# Patient Record
Sex: FEMALE | Race: WHITE | Employment: FULL TIME | URBAN - METROPOLITAN AREA
[De-identification: names, ages, dates, MRNs, and addresses within clinical notes are randomized per-mention and may not be internally consistent; named-entity substitution may affect disease eponyms.]

---
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
## 2017-07-03 ENCOUNTER — TELEPHONE (OUTPATIENT)
Dept: FAMILY MEDICINE CLINIC | Facility: CLINIC | Age: 47
End: 2017-07-03

## 2017-07-03 ENCOUNTER — OFFICE VISIT (OUTPATIENT)
Dept: FAMILY MEDICINE CLINIC | Facility: CLINIC | Age: 47
End: 2017-07-03

## 2017-07-03 VITALS
DIASTOLIC BLOOD PRESSURE: 78 MMHG | HEART RATE: 60 BPM | RESPIRATION RATE: 16 BRPM | TEMPERATURE: 98 F | BODY MASS INDEX: 44 KG/M2 | SYSTOLIC BLOOD PRESSURE: 112 MMHG | WEIGHT: 245 LBS

## 2017-07-03 DIAGNOSIS — L23.7 POISON IVY DERMATITIS: Primary | ICD-10-CM

## 2017-07-03 PROCEDURE — 99214 OFFICE O/P EST MOD 30 MIN: CPT | Performed by: FAMILY MEDICINE

## 2017-07-03 RX ORDER — PREDNISONE 20 MG/1
TABLET ORAL
Qty: 18 TABLET | Refills: 0 | Status: SHIPPED | OUTPATIENT
Start: 2017-07-03 | End: 2018-06-20

## 2017-07-03 NOTE — PROGRESS NOTES
Frankey Mayor is a 40year old female. CC:  Patient presents with:  Rash Skin Problem (integumentary): per pt, poison ivy on legs, arms, wrists, stomach      HPI:  Exposed to poison ivy.  She has had a blistering, itchy rash on B legs for on and lids normal  HENT: No oral lesions. ; 2  NECK: No lymphadenopathy, thyromegaly or masses  CAR: S1, S2 normal, RRR; no S3, no S4; no click; murmur negative  PULM: clear to auscultation B, no accessory muscle use  GI: not examined  PSYCH: alert and orient

## 2017-07-03 NOTE — TELEPHONE ENCOUNTER
Patient stopped in the office and states that she has poison ivy and wants to know if one of the other MDs can work her in today?

## 2017-07-18 ENCOUNTER — TELEPHONE (OUTPATIENT)
Dept: FAMILY MEDICINE CLINIC | Facility: CLINIC | Age: 47
End: 2017-07-18

## 2017-07-18 ENCOUNTER — OFFICE VISIT (OUTPATIENT)
Dept: FAMILY MEDICINE CLINIC | Facility: CLINIC | Age: 47
End: 2017-07-18

## 2017-07-18 VITALS
DIASTOLIC BLOOD PRESSURE: 70 MMHG | SYSTOLIC BLOOD PRESSURE: 130 MMHG | WEIGHT: 240.63 LBS | BODY MASS INDEX: 43 KG/M2 | HEART RATE: 66 BPM | RESPIRATION RATE: 14 BRPM | TEMPERATURE: 98 F

## 2017-07-18 DIAGNOSIS — J01.40 ACUTE NON-RECURRENT PANSINUSITIS: Primary | ICD-10-CM

## 2017-07-18 DIAGNOSIS — R05.9 COUGH: ICD-10-CM

## 2017-07-18 PROCEDURE — 99214 OFFICE O/P EST MOD 30 MIN: CPT | Performed by: FAMILY MEDICINE

## 2017-07-18 RX ORDER — DOXYCYCLINE HYCLATE 100 MG/1
100 CAPSULE ORAL 2 TIMES DAILY
COMMUNITY
End: 2017-07-18 | Stop reason: ALTCHOICE

## 2017-07-18 RX ORDER — PROMETHAZINE HYDROCHLORIDE AND CODEINE PHOSPHATE 6.25; 1 MG/5ML; MG/5ML
SYRUP ORAL EVERY 4 HOURS PRN
Qty: 150 ML | Refills: 0 | Status: SHIPPED
Start: 2017-07-18 | End: 2017-07-25

## 2017-07-18 RX ORDER — AZITHROMYCIN 250 MG/1
TABLET, FILM COATED ORAL
Qty: 6 TABLET | Refills: 0 | Status: SHIPPED | OUTPATIENT
Start: 2017-07-18 | End: 2017-09-26 | Stop reason: ALTCHOICE

## 2017-07-18 NOTE — TELEPHONE ENCOUNTER
Phentermine HCl 30 MG Oral Cap 30 capsule 1 6/21/2017     Sig - Route: Take 1 capsule (30 mg total) by mouth every morning. - Oral      BuPROPion HCl ER, XL, 150 MG Oral Tablet 24 Hr 30 tablet 1 6/21/2017     Sig - Route:  Take 1 tablet (150 mg total) by mo

## 2017-07-18 NOTE — PROGRESS NOTES
HPI:   Tiffanie Schreiber is a 40year old female who presents for upper respiratory symptoms for 10 days. Symptoms include sore throat, head pressure, a lot of post nasal drip, ear pressure, cough, lost voice, tired, chest hurts from coughing.  She h discharge  HEENT: congested, sore throat, face/head/ear pressure  LUNGS: denies shortness of breath with exertion; + cough  CARDIOVASCULAR: denies chest pain on exertion  GI: no nausea or abdominal pain  NEURO: denies headaches    EXAM:   /70   Pulse

## 2017-08-19 RX ORDER — BUPROPION HYDROCHLORIDE 150 MG/1
150 TABLET ORAL DAILY
Qty: 30 TABLET | Refills: 1 | Status: SHIPPED | OUTPATIENT
Start: 2017-08-19 | End: 2017-09-26 | Stop reason: ALTCHOICE

## 2017-08-19 RX ORDER — PHENTERMINE HYDROCHLORIDE 30 MG/1
30 CAPSULE ORAL EVERY MORNING
Qty: 30 CAPSULE | Refills: 0 | Status: SHIPPED | OUTPATIENT
Start: 2017-08-19 | End: 2017-09-26

## 2017-08-19 NOTE — TELEPHONE ENCOUNTER
Script sent/printed--please notify pt she is due for office visit prior to further fills on phentermine, thanks

## 2017-08-19 NOTE — TELEPHONE ENCOUNTER
Pt needs a refill of the   Phentermine HCl 30 MG Oral Cap  BuPROPion HCl ER, XL, 150 MG Oral Tablet 24 Hr  Pharmacy is [facility] in [place]

## 2017-09-26 PROBLEM — F51.04 CHRONIC INSOMNIA: Status: ACTIVE | Noted: 2017-09-26

## 2017-09-26 NOTE — PROGRESS NOTES
Joe Cantu is a 56year old female. HPI:   Pt is here to f/u on her mood, weight, medications. She thinks her mood is doing better, but not quite where she'd like it to be. 2-3 days/week she feels sadder/flatter than she'd like to.  The ot Alcohol use: Yes           0.0 oz/week     Comment: rare          REVIEW OF SYSTEMS:   GENERAL HEALTH: feels well otherwise  SKIN: denies any unusual skin lesions or rashes  RESPIRATORY: denies shortness of breath with exertion  CARDIOVASCULAR: denies mouth every morning. The patient indicates understanding of these issues and agrees to the plan. The patient is asked to return in 6-8 weeks.

## 2017-09-27 ENCOUNTER — MED REC SCAN ONLY (OUTPATIENT)
Dept: FAMILY MEDICINE CLINIC | Facility: CLINIC | Age: 47
End: 2017-09-27

## 2017-09-27 ENCOUNTER — TELEPHONE (OUTPATIENT)
Dept: FAMILY MEDICINE CLINIC | Facility: CLINIC | Age: 47
End: 2017-09-27

## 2017-09-27 NOTE — TELEPHONE ENCOUNTER
----- Message from David Nina MD sent at 9/27/2017  8:33 AM CDT -----  Regarding: PT referral  Please notify pt I received her podiatry records and placed referral for the PT in our basement, thanks

## 2017-09-27 NOTE — TELEPHONE ENCOUNTER
[address] [facility]  [phone]      Spoke with the pt and advised of the therapy orders and the phone number to call- she v/u

## 2017-10-12 ENCOUNTER — TELEPHONE (OUTPATIENT)
Dept: FAMILY MEDICINE CLINIC | Facility: CLINIC | Age: 47
End: 2017-10-12

## 2017-10-12 NOTE — TELEPHONE ENCOUNTER
Pt called, states she is missing a page of the fax of the billing information that Billing from the hospital faxed here to us yesterday to Dr Marcelo Quick nurse.   Please call pt at [phone]

## 2017-10-12 NOTE — TELEPHONE ENCOUNTER
Patient states that she had the hospital fax her billing information to this office. Her  picked it up and she thinks that a page is missing.   Advised that she would need to contact the hospital and ask them to refax the information that she had re

## 2017-10-23 ENCOUNTER — OFFICE VISIT (OUTPATIENT)
Dept: PHYSICAL THERAPY | Age: 47
End: 2017-10-23
Attending: FAMILY MEDICINE
Payer: COMMERCIAL

## 2017-10-23 DIAGNOSIS — M76.71 PERONEAL TENDINITIS OF RIGHT LOWER EXTREMITY: ICD-10-CM

## 2017-10-23 PROCEDURE — 97110 THERAPEUTIC EXERCISES: CPT

## 2017-10-23 PROCEDURE — 97161 PT EVAL LOW COMPLEX 20 MIN: CPT

## 2017-10-23 NOTE — PROGRESS NOTES
LOWER EXTREMITY EVALUATION:   Referring Physician: Dr. Chirag Lopez  Diagnosis: Peroneal tendinitis of bilateral LE  Date of Service: 10/23/2017     PATIENT SUMMARY   Isrrael Moore is a 56year old y/o female who presents to therapy today with complain She has restricted dorsiflexion, plantarflexion and eversion of the left ankle > right ankle. She has decreased strength of tom ankles and difficulty with balance/proprioception.    Alessia Cox would benefit from skilled Physical Therapy to address the above im visits)  · Pt will have increased ankle strength to 5/5 throughout for improved ankle control with ADLs such as prolonged gait and stair negotiation (12 visits)  · Pt will have improved SLS to >30s for increased ankle stability with ambulation on uneven warren

## 2017-10-30 ENCOUNTER — OFFICE VISIT (OUTPATIENT)
Dept: PHYSICAL THERAPY | Age: 47
End: 2017-10-30
Attending: FAMILY MEDICINE
Payer: COMMERCIAL

## 2017-10-30 DIAGNOSIS — M76.71 PERONEAL TENDINITIS OF RIGHT LOWER EXTREMITY: ICD-10-CM

## 2017-10-30 PROCEDURE — 97140 MANUAL THERAPY 1/> REGIONS: CPT

## 2017-10-30 PROCEDURE — 97110 THERAPEUTIC EXERCISES: CPT

## 2017-10-30 NOTE — PROGRESS NOTES
Dx: Peroneal tendinitis of bilateral LE          Authorized # of Visits:  Oscar Sorto PPO 12 recommended       Next MD visit: none scheduled Fall Risk: standard         Precautions: None    Subjective: States she is doing well, has the same soreness.  Was on her f Date:   Tx#: 3 Date: Tx#: 4 Date: Tx#: 5 Date: Tx#: 6 Date: Tx#: 7 Date:   Tx#: 8   Ther-Ex Ther-Ex  Ther-Ex  Ther-Ex Ther-Ex Ther-Ex  There-Ex   TM walking x 10min self selected pace, 1.9mph         AROM DF/PF/inv/ev x10         Gastroc/soleus stret

## 2017-11-02 ENCOUNTER — OFFICE VISIT (OUTPATIENT)
Dept: PHYSICAL THERAPY | Age: 47
End: 2017-11-02
Attending: FAMILY MEDICINE
Payer: COMMERCIAL

## 2017-11-02 DIAGNOSIS — M76.71 PERONEAL TENDINITIS OF RIGHT LOWER EXTREMITY: ICD-10-CM

## 2017-11-02 PROCEDURE — 97110 THERAPEUTIC EXERCISES: CPT

## 2017-11-02 PROCEDURE — 97140 MANUAL THERAPY 1/> REGIONS: CPT

## 2017-11-02 NOTE — PROGRESS NOTES
Dx: Peroneal tendinitis of bilateral LE          Authorized # of Visits:  [facility] EMETERIO ROSAS PPO 12 recommended       Next MD visit: none scheduled Fall Risk: standard         Precautions: None    Subjective: States both of her feet were very sore after sitting for a lo Treatment: 47 min  Total Treatment Time: 45 min  Date: 10/30/2017  Tx#: 2 Date: Tx#: 3 Date: Tx#: 4 Date: Tx#: 5 Date: Tx#: 6 Date: Tx#: 7 Date:   Tx#: 8   Ther-Ex Ther-Ex  Ther-Ex  Ther-Ex Ther-Ex Ther-Ex  There-Ex   TM walking x 10min self select

## 2017-11-06 ENCOUNTER — OFFICE VISIT (OUTPATIENT)
Dept: PHYSICAL THERAPY | Age: 47
End: 2017-11-06
Attending: FAMILY MEDICINE
Payer: COMMERCIAL

## 2017-11-06 PROCEDURE — 97110 THERAPEUTIC EXERCISES: CPT

## 2017-11-06 PROCEDURE — 97140 MANUAL THERAPY 1/> REGIONS: CPT

## 2017-11-06 NOTE — PROGRESS NOTES
Dx: Peroneal tendinitis of bilateral LE          Authorized # of Visits:  Fabiola Mauricio 150 PPO 12 recommended       Next MD visit: none scheduled Fall Risk: standard         Precautions: None    Subjective: She states her left arch was a little sore this morning, but Ther-Ex Ther-Ex  Ther-Ex  Ther-Ex Ther-Ex Ther-Ex  There-Ex   TM walking x 10min self selected pace, 1.9mph TM walking x 10min self selected pace, 1.9mph TM walking x 10 min, 2.0mph       AROM DF/PF/inv/ev x10 Seated PF with red tband/focus on eccentric

## 2017-11-08 ENCOUNTER — OFFICE VISIT (OUTPATIENT)
Dept: FAMILY MEDICINE CLINIC | Facility: CLINIC | Age: 47
End: 2017-11-08

## 2017-11-08 VITALS
WEIGHT: 232.63 LBS | RESPIRATION RATE: 14 BRPM | DIASTOLIC BLOOD PRESSURE: 80 MMHG | HEART RATE: 74 BPM | BODY MASS INDEX: 42 KG/M2 | TEMPERATURE: 99 F | SYSTOLIC BLOOD PRESSURE: 120 MMHG

## 2017-11-08 DIAGNOSIS — F43.21 ADJUSTMENT DISORDER WITH DEPRESSED MOOD: ICD-10-CM

## 2017-11-08 DIAGNOSIS — F51.04 CHRONIC INSOMNIA: ICD-10-CM

## 2017-11-08 DIAGNOSIS — Z71.3 WEIGHT LOSS COUNSELING, ENCOUNTER FOR: Primary | ICD-10-CM

## 2017-11-08 PROCEDURE — 99213 OFFICE O/P EST LOW 20 MIN: CPT | Performed by: FAMILY MEDICINE

## 2017-11-08 NOTE — PROGRESS NOTES
Polo Saunders is a 56year old female. HPI:   Pt is here to f/u from her last visit end of sept. We upped her wellbutrin to 300 and she feels much better, less emotional, less chavez, more even keel. No side effect.  She is super happy about well developed, well nourished,in no apparent distress  SKIN: no rashes,no suspicious lesions  LUNGS: clear to auscultation  CARDIO: RRR without murmur  PSYCH: good affect, pleasant, appropriate, cooperative  EXTREMITIES: no cyanosis, clubbing or edema

## 2017-11-13 ENCOUNTER — OFFICE VISIT (OUTPATIENT)
Dept: PHYSICAL THERAPY | Age: 47
End: 2017-11-13
Attending: FAMILY MEDICINE
Payer: COMMERCIAL

## 2017-11-16 ENCOUNTER — OFFICE VISIT (OUTPATIENT)
Dept: PHYSICAL THERAPY | Age: 47
End: 2017-11-16
Attending: FAMILY MEDICINE
Payer: COMMERCIAL

## 2017-11-16 DIAGNOSIS — M76.71 PERONEAL TENDINITIS OF RIGHT LOWER EXTREMITY: ICD-10-CM

## 2017-11-16 PROCEDURE — 97112 NEUROMUSCULAR REEDUCATION: CPT

## 2017-11-16 PROCEDURE — 97140 MANUAL THERAPY 1/> REGIONS: CPT

## 2017-11-16 PROCEDURE — 97110 THERAPEUTIC EXERCISES: CPT

## 2017-11-16 NOTE — PROGRESS NOTES
Dx: Peroneal tendinitis of bilateral LE          Authorized # of Visits:  Earl Sharp PPO 12 recommended       Next MD visit: none scheduled Fall Risk: standard         Precautions: None    Subjective: She states she was sitting on a bar stool yesterday with her 10/30/2017  Tx#: 2 Date: Tx#: 3 Date: 11/6/2017  Tx#: 4 Date: 11/16/2017  Tx#: 5 Date: Tx#: 6 Date: Tx#: 7 Date:   Tx#: 8   Ther-Ex Ther-Ex  Ther-Ex  Ther-Ex Ther-Ex Ther-Ex  There-Ex   TM walking x 10min self selected pace, 1.9mph TM walking x 10min

## 2017-11-20 ENCOUNTER — OFFICE VISIT (OUTPATIENT)
Dept: PHYSICAL THERAPY | Age: 47
End: 2017-11-20
Attending: FAMILY MEDICINE
Payer: COMMERCIAL

## 2017-11-20 DIAGNOSIS — M76.71 PERONEAL TENDINITIS OF RIGHT LOWER EXTREMITY: ICD-10-CM

## 2017-11-20 PROCEDURE — 97112 NEUROMUSCULAR REEDUCATION: CPT

## 2017-11-20 PROCEDURE — 97110 THERAPEUTIC EXERCISES: CPT

## 2017-11-20 PROCEDURE — 97140 MANUAL THERAPY 1/> REGIONS: CPT

## 2017-11-20 NOTE — PROGRESS NOTES
Dx: Peroneal tendinitis of bilateral LE          Authorized # of Visits:  [employer] PPO 12 recommended       Next MD visit: none scheduled Fall Risk: standard         Precautions: None    Subjective: Navneet Quispe states she is doing well.  She had some pain in her ri min  Date: 10/30/2017  Tx#: 2 Date: Tx#: 3 Date: 11/6/2017  Tx#: 4 Date: 11/16/2017  Tx#: 5 Date: 11/20/2017  Tx#: 6 Date: Tx#: 7 Date:   Tx#: 8   Ther-Ex Ther-Ex  Ther-Ex  Ther-Ex Ther-Ex Ther-Ex  There-Ex   TM walking x 10min self selected pace, 1.9mp

## 2017-11-27 ENCOUNTER — OFFICE VISIT (OUTPATIENT)
Dept: PHYSICAL THERAPY | Age: 47
End: 2017-11-27
Attending: FAMILY MEDICINE
Payer: COMMERCIAL

## 2017-11-27 DIAGNOSIS — M76.71 PERONEAL TENDINITIS OF RIGHT LOWER EXTREMITY: ICD-10-CM

## 2017-11-27 PROCEDURE — 97110 THERAPEUTIC EXERCISES: CPT

## 2017-11-27 NOTE — PROGRESS NOTES
Dx: Peroneal tendinitis of bilateral LE          Authorized # of Visits:  [employer] PPO 12 recommended       Next MD visit: none scheduled Fall Risk: standard         Precautions: None    Discharge Summary  Guanaco Brown has attended 7 visits in physical therapy for demonstrate improved DF AROM to >= 10 degrees to promote proper foot clearance during gait and greater ease descending stairs without compensation (4 visits) MET 11/27/2017  · Pt will have increased ankle strength to 5/5 throughout for improved ankle contr X 15 - - Reassessment x 10 min    Standing heel raises 2 sets x 10 Reviewed Gastroc/soleus stretch Gastroc/soleus stretch Gastroc/soleus stretch Gastroc/soleus stretch    -     Balance exercises for HEP    Manual Manual Manual Manual  Manual  Manual Issa

## 2017-11-29 RX ORDER — PHENTERMINE HYDROCHLORIDE 30 MG/1
CAPSULE ORAL
Qty: 30 CAPSULE | Refills: 1 | Status: SHIPPED
Start: 2017-11-29 | End: 2018-01-07

## 2017-11-29 RX ORDER — TRAZODONE HYDROCHLORIDE 100 MG/1
TABLET ORAL
Qty: 30 TABLET | Refills: 6 | Status: SHIPPED | OUTPATIENT
Start: 2017-11-29 | End: 2018-02-28

## 2017-11-29 NOTE — TELEPHONE ENCOUNTER
Last refills -   Phenergan - 9/26/17 - #30 with 1 refill  Trazodone - 8/20/16 - #30 with 6 refills  Last office visit- 11/8/17

## 2018-01-02 ENCOUNTER — TELEPHONE (OUTPATIENT)
Dept: FAMILY MEDICINE CLINIC | Facility: CLINIC | Age: 48
End: 2018-01-02
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
## 2018-01-02 NOTE — TELEPHONE ENCOUNTER
That would be unusual and a bit of a longshot, but certainly worth a try.  We would change to 150mg pill, take daily for 2 weeks then every other day for 10 days then stop and f/u with me a few weeks later to see how she's doing

## 2018-01-02 NOTE — TELEPHONE ENCOUNTER
PT CALLED AND ADV THAT SHE WOULD LIKE TO STOP TAKING     BuPROPion HCl ER, XL, 300 MG Oral Tablet 24 Hr    PT WOULD LIKE TO KNOW THE PROPER STEPS.  ADV PT MAY NOT GET A CALL UNTIL TOMORROW    PT V/U    THANK YOU

## 2018-01-03 RX ORDER — BUPROPION HYDROCHLORIDE 150 MG/1
150 TABLET ORAL DAILY
Qty: 19 TABLET | Refills: 0 | Status: SHIPPED | OUTPATIENT
Start: 2018-01-03 | End: 2018-02-28 | Stop reason: ALTCHOICE

## 2018-01-03 NOTE — TELEPHONE ENCOUNTER
Spoke with the pt and advised of the notes from Dr. Chirag Lopez to wean off of the buproprion.  Called the pt and advised of the notes form Dr. Chirag Lopez and she v/u called the 150mg tabs to the pharmacy

## 2018-01-08 RX ORDER — PHENTERMINE HYDROCHLORIDE 30 MG/1
CAPSULE ORAL
Qty: 30 CAPSULE | Refills: 0 | Status: SHIPPED
Start: 2018-01-08 | End: 2018-01-22

## 2018-01-08 NOTE — TELEPHONE ENCOUNTER
Spoke with the pt and advised that Dr. Lawrence Beltrán gave her a refill of the phentermine but she will need to be seen prior tp any more refills- advised that appointments are required every 2 months while on this med-  She v/u  I asked if she wanted to schedule an

## 2018-01-22 RX ORDER — PHENTERMINE HYDROCHLORIDE 30 MG/1
CAPSULE ORAL
Qty: 30 CAPSULE | Refills: 0 | Status: SHIPPED
Start: 2018-01-22 | End: 2018-02-28 | Stop reason: ALTCHOICE

## 2018-01-22 RX ORDER — PHENTERMINE HYDROCHLORIDE 30 MG/1
CAPSULE ORAL
Qty: 30 CAPSULE | Refills: 0
Start: 2018-01-22

## 2018-01-22 NOTE — TELEPHONE ENCOUNTER
She is aware that the refill was denied because she needs OV. She scheduled OV but cannot get in until you return, would you consider refilling under those circumstances?

## 2018-01-22 NOTE — TELEPHONE ENCOUNTER
Last refilled on 1/8/18 for # 30 with 0 rf. Last seen on 11/8/17. No future appointments. Thank you.

## 2018-01-30 ENCOUNTER — TELEPHONE (OUTPATIENT)
Dept: FAMILY MEDICINE CLINIC | Facility: CLINIC | Age: 48
End: 2018-01-30

## 2018-01-30 NOTE — TELEPHONE ENCOUNTER
Pt needs refill of the med the [facility] prescribed last week for weight control. Angelica Ogden does not have a script for this.

## 2018-01-30 NOTE — TELEPHONE ENCOUNTER
Called the pharmacy to verify that they received the script and they did- but it was refill too soon so it was put on hold- they processed it today and it did go through and the pt will be able to pick it up today  Called the pt and advised- she v/u

## 2018-03-01 NOTE — PROGRESS NOTES
Chapincito Miller is a 56year old female. HPI:   Pt is here to f/u on her weight/mood/sleep. She's like to stick with the trazodone, no noted SEs and helps her sleep.     She doesn't notice herself \"feeling\" different on phentermine, but she c Packs/day: 1.00      Years: 24.00        Types: Cigarettes     Start date: 10/21/2016  Smokeless tobacco: Never Used                      Alcohol use: Yes           0.0 oz/week     Comment: rare          REVIEW OF SYSTEMS:   GENERAL HEALTH: feels well oth

## 2018-05-09 ENCOUNTER — OFFICE VISIT (OUTPATIENT)
Dept: FAMILY MEDICINE CLINIC | Facility: CLINIC | Age: 48
End: 2018-05-09

## 2018-05-09 VITALS
HEART RATE: 74 BPM | DIASTOLIC BLOOD PRESSURE: 80 MMHG | BODY MASS INDEX: 42 KG/M2 | RESPIRATION RATE: 12 BRPM | WEIGHT: 240 LBS | TEMPERATURE: 99 F | SYSTOLIC BLOOD PRESSURE: 120 MMHG

## 2018-05-09 DIAGNOSIS — Z12.31 ENCOUNTER FOR SCREENING MAMMOGRAM FOR BREAST CANCER: ICD-10-CM

## 2018-05-09 DIAGNOSIS — J02.9 PHARYNGITIS, UNSPECIFIED ETIOLOGY: Primary | ICD-10-CM

## 2018-05-09 PROCEDURE — 99213 OFFICE O/P EST LOW 20 MIN: CPT | Performed by: FAMILY MEDICINE

## 2018-05-09 NOTE — PROGRESS NOTES
HPI:    Patient ID: Valentin Kirkland is a 56year old female. Pt here to discuss sore throat. She developed a very sore throat on Saturday, hurt to swallow, hurt in neck and radiated toward her ears. No fever or chills. No body aches.   No ra Pharyngitis, unspecified etiology  (primary encounter diagnosis)  -likely from PND, viral or allergic; reviewed various OTC/home remedies to try, f/u in 1 week if no better, sooner for worsening symptoms  Encounter for screening mammogram for breast canc

## 2018-06-20 ENCOUNTER — OFFICE VISIT (OUTPATIENT)
Dept: FAMILY MEDICINE CLINIC | Facility: CLINIC | Age: 48
End: 2018-06-20

## 2018-06-20 VITALS
DIASTOLIC BLOOD PRESSURE: 84 MMHG | WEIGHT: 241.81 LBS | RESPIRATION RATE: 14 BRPM | HEART RATE: 79 BPM | BODY MASS INDEX: 42.31 KG/M2 | SYSTOLIC BLOOD PRESSURE: 124 MMHG | TEMPERATURE: 98 F | OXYGEN SATURATION: 99 % | HEIGHT: 63.5 IN

## 2018-06-20 DIAGNOSIS — F43.21 ADJUSTMENT DISORDER WITH DEPRESSED MOOD: ICD-10-CM

## 2018-06-20 DIAGNOSIS — F51.04 CHRONIC INSOMNIA: ICD-10-CM

## 2018-06-20 DIAGNOSIS — L23.7 POISON IVY: Primary | ICD-10-CM

## 2018-06-20 DIAGNOSIS — Z71.3 WEIGHT LOSS COUNSELING, ENCOUNTER FOR: ICD-10-CM

## 2018-06-20 PROCEDURE — 99214 OFFICE O/P EST MOD 30 MIN: CPT | Performed by: FAMILY MEDICINE

## 2018-06-20 RX ORDER — PREDNISONE 20 MG/1
TABLET ORAL
Qty: 18 TABLET | Refills: 0 | Status: SHIPPED | OUTPATIENT
Start: 2018-06-20 | End: 2019-08-26

## 2018-06-25 NOTE — PROGRESS NOTES
Belkys Del Rio is a 56year old female. HPI:   Following up on mood/sleep/weight and discussing new problem skin rahs.     Was outside in yard, has hx of poison ivy there, figures it was that again, but not resolving, very itching red patches on down and anxious, nothing too persistent, more feeling tired and overextended  NEURO: denies headaches    EXAM:   /84   Pulse 79   Temp 98.2 °F (36.8 °C) (Temporal)   Resp 14   Ht 63.5\"   Wt 241 lb 12.8 oz   LMP 05/16/2018   SpO2 99%   BMI 42.16 kg/

## 2018-11-06 ENCOUNTER — TELEPHONE (OUTPATIENT)
Dept: FAMILY MEDICINE CLINIC | Facility: CLINIC | Age: 48
End: 2018-11-06

## 2018-11-06 NOTE — TELEPHONE ENCOUNTER
Spoke with the pt and she has some Moles on neck- that have grown quickly-  Gave her the numbers below    Lilli Orellana [address]: [phone],Fax: [address]

## 2018-12-14 ENCOUNTER — OFFICE VISIT (OUTPATIENT)
Dept: FAMILY MEDICINE CLINIC | Facility: CLINIC | Age: 48
End: 2018-12-14
Payer: COMMERCIAL

## 2018-12-14 VITALS
TEMPERATURE: 98 F | BODY MASS INDEX: 43.71 KG/M2 | DIASTOLIC BLOOD PRESSURE: 70 MMHG | SYSTOLIC BLOOD PRESSURE: 114 MMHG | HEIGHT: 64 IN | RESPIRATION RATE: 16 BRPM | WEIGHT: 256 LBS | HEART RATE: 82 BPM

## 2018-12-14 DIAGNOSIS — Z71.3 WEIGHT LOSS COUNSELING, ENCOUNTER FOR: ICD-10-CM

## 2018-12-14 DIAGNOSIS — F51.04 CHRONIC INSOMNIA: ICD-10-CM

## 2018-12-14 DIAGNOSIS — Z13.1 DIABETES MELLITUS SCREENING: ICD-10-CM

## 2018-12-14 DIAGNOSIS — R53.82 CHRONIC FATIGUE: Primary | ICD-10-CM

## 2018-12-14 DIAGNOSIS — F43.21 ADJUSTMENT DISORDER WITH DEPRESSED MOOD: ICD-10-CM

## 2018-12-14 DIAGNOSIS — Z13.220 LIPID SCREENING: ICD-10-CM

## 2018-12-14 LAB
ALBUMIN SERPL-MCNC: 3.3 G/DL (ref 3.1–4.5)
ALBUMIN/GLOB SERPL: 0.8 {RATIO} (ref 1–2)
ALP LIVER SERPL-CCNC: 71 U/L (ref 39–100)
ALT SERPL-CCNC: 28 U/L (ref 14–54)
ANION GAP SERPL CALC-SCNC: 5 MMOL/L (ref 0–18)
AST SERPL-CCNC: 16 U/L (ref 15–41)
BASOPHILS # BLD AUTO: 0.08 X10(3) UL (ref 0–0.1)
BASOPHILS NFR BLD AUTO: 0.9 %
BILIRUB SERPL-MCNC: 0.4 MG/DL (ref 0.1–2)
BUN BLD-MCNC: 15 MG/DL (ref 8–20)
BUN/CREAT SERPL: 18.5 (ref 10–20)
CALCIUM BLD-MCNC: 8.6 MG/DL (ref 8.3–10.3)
CHLORIDE SERPL-SCNC: 107 MMOL/L (ref 101–111)
CHOLEST SMN-MCNC: 150 MG/DL (ref ?–200)
CO2 SERPL-SCNC: 25 MMOL/L (ref 22–32)
CREAT BLD-MCNC: 0.81 MG/DL (ref 0.55–1.02)
DEPRECATED HBV CORE AB SER IA-ACNC: 30.6 NG/ML (ref 12–240)
EOSINOPHIL # BLD AUTO: 0.32 X10(3) UL (ref 0–0.3)
EOSINOPHIL NFR BLD AUTO: 3.7 %
ERYTHROCYTE [DISTWIDTH] IN BLOOD BY AUTOMATED COUNT: 12.8 % (ref 11.5–16)
EST. AVERAGE GLUCOSE BLD GHB EST-MCNC: 111 MG/DL (ref 68–126)
GLOBULIN PLAS-MCNC: 4 G/DL (ref 2.8–4.4)
GLUCOSE BLD-MCNC: 93 MG/DL (ref 70–99)
HAV AB SERPL IA-ACNC: 393 PG/ML (ref 193–986)
HBA1C MFR BLD HPLC: 5.5 % (ref ?–5.7)
HCT VFR BLD AUTO: 39 % (ref 34–50)
HDLC SERPL-MCNC: 59 MG/DL (ref 40–59)
HGB BLD-MCNC: 13 G/DL (ref 12–16)
IMMATURE GRANULOCYTE COUNT: 0.01 X10(3) UL (ref 0–1)
IMMATURE GRANULOCYTE RATIO %: 0.1 %
LDLC SERPL CALC-MCNC: 49 MG/DL (ref ?–100)
LYMPHOCYTES # BLD AUTO: 2.24 X10(3) UL (ref 0.9–4)
LYMPHOCYTES NFR BLD AUTO: 25.7 %
M PROTEIN MFR SERPL ELPH: 7.3 G/DL (ref 6.4–8.2)
MCH RBC QN AUTO: 29.3 PG (ref 27–33.2)
MCHC RBC AUTO-ENTMCNC: 33.3 G/DL (ref 31–37)
MCV RBC AUTO: 88 FL (ref 81–100)
MONOCYTES # BLD AUTO: 0.63 X10(3) UL (ref 0.1–1)
MONOCYTES NFR BLD AUTO: 7.2 %
NEUTROPHIL ABS PRELIM: 5.42 X10 (3) UL (ref 1.3–6.7)
NEUTROPHILS # BLD AUTO: 5.42 X10(3) UL (ref 1.3–6.7)
NEUTROPHILS NFR BLD AUTO: 62.4 %
NONHDLC SERPL-MCNC: 91 MG/DL (ref ?–130)
OSMOLALITY SERPL CALC.SUM OF ELEC: 285 MOSM/KG (ref 275–295)
PLATELET # BLD AUTO: 210 10(3)UL (ref 150–450)
POTASSIUM SERPL-SCNC: 4.1 MMOL/L (ref 3.6–5.1)
RBC # BLD AUTO: 4.43 X10(6)UL (ref 3.8–5.1)
RED CELL DISTRIBUTION WIDTH-SD: 41.1 FL (ref 35.1–46.3)
SODIUM SERPL-SCNC: 137 MMOL/L (ref 136–144)
T3 SERPL-MCNC: 108 NG/DL (ref 60–181)
T4 FREE SERPL-MCNC: 0.9 NG/DL (ref 0.9–1.8)
TRIGL SERPL-MCNC: 212 MG/DL (ref 30–149)
TSI SER-ACNC: 0.94 MIU/ML (ref 0.35–5.5)
VIT D+METAB SERPL-MCNC: 25.8 NG/ML (ref 30–100)
VLDLC SERPL CALC-MCNC: 42 MG/DL (ref 0–30)
WBC # BLD AUTO: 8.7 X10(3) UL (ref 4–13)

## 2018-12-14 PROCEDURE — 36415 COLL VENOUS BLD VENIPUNCTURE: CPT | Performed by: FAMILY MEDICINE

## 2018-12-14 PROCEDURE — 84480 ASSAY TRIIODOTHYRONINE (T3): CPT | Performed by: FAMILY MEDICINE

## 2018-12-14 PROCEDURE — 84439 ASSAY OF FREE THYROXINE: CPT | Performed by: FAMILY MEDICINE

## 2018-12-14 PROCEDURE — 82306 VITAMIN D 25 HYDROXY: CPT | Performed by: FAMILY MEDICINE

## 2018-12-14 PROCEDURE — 82728 ASSAY OF FERRITIN: CPT | Performed by: FAMILY MEDICINE

## 2018-12-14 PROCEDURE — 80061 LIPID PANEL: CPT | Performed by: FAMILY MEDICINE

## 2018-12-14 PROCEDURE — 99214 OFFICE O/P EST MOD 30 MIN: CPT | Performed by: FAMILY MEDICINE

## 2018-12-14 PROCEDURE — 83036 HEMOGLOBIN GLYCOSYLATED A1C: CPT | Performed by: FAMILY MEDICINE

## 2018-12-14 PROCEDURE — 80050 GENERAL HEALTH PANEL: CPT | Performed by: FAMILY MEDICINE

## 2018-12-14 PROCEDURE — 82607 VITAMIN B-12: CPT | Performed by: FAMILY MEDICINE

## 2018-12-14 RX ORDER — BIOTIN 1 MG
4000 TABLET ORAL DAILY
COMMUNITY

## 2018-12-14 RX ORDER — MULTIVIT-MIN/IRON FUM/FOLIC AC 7.5 MG-4
1 TABLET ORAL DAILY
COMMUNITY

## 2018-12-14 RX ORDER — BUPROPION HYDROCHLORIDE 150 MG/1
150 TABLET ORAL DAILY
Qty: 30 TABLET | Refills: 1 | Status: SHIPPED | OUTPATIENT
Start: 2018-12-14 | End: 2019-05-29

## 2018-12-14 RX ORDER — PHENTERMINE HYDROCHLORIDE 37.5 MG/1
37.5 TABLET ORAL
Qty: 30 TABLET | Refills: 1 | Status: SHIPPED
Start: 2018-12-14 | End: 2019-05-29

## 2018-12-14 NOTE — PROGRESS NOTES
Randal Fields is a 53year old female. HPI:   Patient c/o feeling tired all the time, but this is pretty lonog-standing for her, no obvious changes.       Patient still falling asleep great with the trazodone and stays in bed sleeping she thinks 10/21/2016      Smokeless tobacco: Never Used    Alcohol use:  Yes      Alcohol/week: 0.0 oz      Comment: rare     Drug use: No         REVIEW OF SYSTEMS:   GENERAL HEALTH: feels well otherwise  SKIN: denies any unusual skin lesions or rashes  RESPIRATORY: PANEL (14); Future  -     HEMOGLOBIN A1C; Future    Other orders  -     BuPROPion HCl ER, XL, 150 MG Oral Tablet 24 Hr; Take 1 tablet (150 mg total) by mouth daily.  -     Phentermine HCl 37.5 MG Oral Tab;  Take 1 tablet (37.5 mg total) by mouth every morni

## 2018-12-17 ENCOUNTER — TELEPHONE (OUTPATIENT)
Dept: FAMILY MEDICINE CLINIC | Facility: CLINIC | Age: 48
End: 2018-12-17

## 2018-12-17 DIAGNOSIS — R53.83 OTHER FATIGUE: ICD-10-CM

## 2018-12-17 DIAGNOSIS — E55.9 VITAMIN D INSUFFICIENCY: Primary | ICD-10-CM

## 2018-12-17 NOTE — TELEPHONE ENCOUNTER
Patient notified and verbalized understanding. Patient states she had the understanding the tests would check for cancer.  Advised patient CBC would check for lukemia and liver and kidney function can detect issues with function but it is not a definite w

## 2018-12-17 NOTE — PROGRESS NOTES
Please notify patient good news, labs look good, including blood counts, blood sugar, kidneys, liver, electrolytes, thyroid. Vit D a little low, can double her current supplement. Vit B12 a smidge low, can take OTC b12 500-1000mcg every other day.   Can re

## 2018-12-17 NOTE — TELEPHONE ENCOUNTER
Notes recorded by Yevette Severin, MD on 12/16/2018 at 9:23 PM CST  Please notify patient good news, labs look good, including blood counts, blood sugar, kidneys, liver, electrolytes, thyroid.  Vit D a little low, can double her current supplement.  Vit B12 a

## 2019-01-26 ENCOUNTER — OFFICE VISIT (OUTPATIENT)
Dept: FAMILY MEDICINE CLINIC | Facility: CLINIC | Age: 49
End: 2019-01-26
Payer: COMMERCIAL

## 2019-01-26 VITALS
HEART RATE: 92 BPM | SYSTOLIC BLOOD PRESSURE: 116 MMHG | DIASTOLIC BLOOD PRESSURE: 72 MMHG | TEMPERATURE: 98 F | BODY MASS INDEX: 43 KG/M2 | OXYGEN SATURATION: 99 % | WEIGHT: 253 LBS

## 2019-01-26 DIAGNOSIS — R09.81 SINUS CONGESTION: ICD-10-CM

## 2019-01-26 DIAGNOSIS — R05.9 COUGH: Primary | ICD-10-CM

## 2019-01-26 PROCEDURE — 99213 OFFICE O/P EST LOW 20 MIN: CPT | Performed by: FAMILY MEDICINE

## 2019-01-26 RX ORDER — BENZONATATE 100 MG/1
100 CAPSULE ORAL 2 TIMES DAILY PRN
Qty: 14 CAPSULE | Refills: 0 | Status: SHIPPED | OUTPATIENT
Start: 2019-01-26 | End: 2019-03-23 | Stop reason: ALTCHOICE

## 2019-01-26 RX ORDER — DOXYCYCLINE HYCLATE 100 MG
100 TABLET ORAL 2 TIMES DAILY
Qty: 14 TABLET | Refills: 0 | Status: SHIPPED | OUTPATIENT
Start: 2019-01-26 | End: 2019-02-02

## 2019-01-26 RX ORDER — FLUTICASONE PROPIONATE 50 MCG
2 SPRAY, SUSPENSION (ML) NASAL DAILY
Qty: 1 BOTTLE | Refills: 0 | Status: SHIPPED | OUTPATIENT
Start: 2019-01-26 | End: 2019-02-05

## 2019-01-26 NOTE — PROGRESS NOTES
HPI:    Patient ID: Gisel Perrin is a 53year old female. Patient presents with:  Sinus Problem: per pt- puffy eyes & no voice  Cough      HPI  Patient is here for cough and sinus congestion for 4 days.  States cough is productive of yellowis TAKE 1 TABLET BY MOUTH NIGHTLY ) Disp: 90 tablet Rfl: 1       /72   Pulse 92   Temp 98 °F (36.7 °C) (Temporal)   Wt 253 lb   LMP 12/14/2018   SpO2 99%   BMI 43.43 kg/m²     Allergies:  Penicillins                  HISTORY:  Past Medical History:   Mike Burton congestion  Most likely bacterial sinusitis. Prescribed doxycycline and Flonase for congestion. Also prescribed Tessalon Perles for cough. She will call or come back if symptoms worsen or do not get better.     -     Doxycycline Hyclate 100 MG Oral Tab;

## 2019-01-29 ENCOUNTER — TELEPHONE (OUTPATIENT)
Dept: FAMILY MEDICINE CLINIC | Facility: CLINIC | Age: 49
End: 2019-01-29

## 2019-01-29 RX ORDER — PROMETHAZINE HYDROCHLORIDE AND CODEINE PHOSPHATE 6.25; 1 MG/5ML; MG/5ML
SYRUP ORAL NIGHTLY PRN
Qty: 120 ML | Refills: 0 | Status: SHIPPED | OUTPATIENT
Start: 2019-01-29 | End: 2019-02-08

## 2019-01-29 NOTE — TELEPHONE ENCOUNTER
Script has been faxed. Patient has been notified, verbalized understanding of information. Denies further questions.

## 2019-01-29 NOTE — TELEPHONE ENCOUNTER
Pt advise that last weekend she was seen and given pills for cough. The pills aren't doing anything for her, also taking cepacol. Is there anything else [facility] can prescribe for the cough?  Indiana Cotton

## 2019-02-04 ENCOUNTER — TELEPHONE (OUTPATIENT)
Dept: FAMILY MEDICINE CLINIC | Facility: CLINIC | Age: 49
End: 2019-02-04

## 2019-02-04 RX ORDER — CEFDINIR 300 MG/1
300 CAPSULE ORAL 2 TIMES DAILY
Qty: 14 CAPSULE | Refills: 0 | Status: SHIPPED | OUTPATIENT
Start: 2019-02-04 | End: 2019-02-11

## 2019-02-04 NOTE — TELEPHONE ENCOUNTER
Hydroxyzine, Q-Dryl, Ondasteron      Last Written Prescription Date: 06/19/17 (all)  Last Fill Quantity: 236ml, 240ml, 60ml  # refills: 0 (all)   Last Office Visit with G, P or [facility] prescribing provider: 04/29/17                                         Next 5 appointments (look out 90 days)     Aug 21, 2017 10:15 AM CDT   Well Child with Karla Sarabia MD   [facility] ([facility])    [address]  [facility] [id number] [phone]                  Routing refill request to provider for review/approval because:  Per Peds Protocol        
I'd be fine trying omnicef, yes it should cover pneumonia, thought 60% improvement I'd be shocked if pneumonia.   She can try alb neb if she has that, if it helps can cont every 4 hours as needed until feeling better, if doesn't help with cough set it aside
I'd typically expect more improvement at this stage after abx, though it's possible just needs more time. ..   She could give it a few more days, see if body continues to fight this off, or we could try abx cefdinir 300mg po BID #14 0 refills (but only if he
PT CALLED AND ADV STILL HAS LIMITED VOICE, CHEST HEAVY, DRAINAGE    FINISHED MEDS    LOOKING FOR RECOMMENDATIONS    THANK YOU
Script sent  Agree wit advice given
Spoke with the pt -   Cough is better, she does have a little bit of drainage of her throat.   She is coughing up a  little bit of mucus- clear      Top of the chest at the throat it is tender- she states that it hurts on the inside- thinks it is from cough
Spoke with the pt and advised of the instructions from Dr. Adalberto Dixon- the pt v/u  She asks if she can still use the cough medicine at night and I advised that she can if it is helping her cough- she states that it is.
Spoke with the pt and she is not sure what the response was to the pcn- she states that she was a child   She wonders about pneumonia and if the abx would cover that if she has that.          She has a nebulizer- and she wonders if she should try this-
rxs done  
What Is The Reason For Today's Visit?: History of Non-Melanoma Skin Cancer
How Many Skin Cancers Have You Had?: more than one
When Was Your Last Cancer Diagnosed?: SCC 6/2017\\nBCC 10/1990

## 2019-02-09 ENCOUNTER — OFFICE VISIT (OUTPATIENT)
Dept: FAMILY MEDICINE CLINIC | Facility: CLINIC | Age: 49
End: 2019-02-09
Payer: COMMERCIAL

## 2019-02-09 VITALS
DIASTOLIC BLOOD PRESSURE: 76 MMHG | HEART RATE: 89 BPM | RESPIRATION RATE: 14 BRPM | BODY MASS INDEX: 43 KG/M2 | SYSTOLIC BLOOD PRESSURE: 126 MMHG | OXYGEN SATURATION: 98 % | WEIGHT: 250 LBS | TEMPERATURE: 97 F

## 2019-02-09 DIAGNOSIS — J01.10 ACUTE NON-RECURRENT FRONTAL SINUSITIS: Primary | ICD-10-CM

## 2019-02-09 DIAGNOSIS — J20.9 BRONCHITIS WITH BRONCHOSPASM: ICD-10-CM

## 2019-02-09 PROCEDURE — 99214 OFFICE O/P EST MOD 30 MIN: CPT | Performed by: FAMILY MEDICINE

## 2019-02-09 RX ORDER — ALBUTEROL SULFATE 2.5 MG/3ML
2.5 SOLUTION RESPIRATORY (INHALATION) EVERY 4 HOURS PRN
Qty: 25 VIAL | Refills: 0 | Status: SHIPPED | OUTPATIENT
Start: 2019-02-09 | End: 2019-03-23 | Stop reason: ALTCHOICE

## 2019-02-09 RX ORDER — LEVOFLOXACIN 500 MG/1
500 TABLET, FILM COATED ORAL DAILY
Qty: 10 TABLET | Refills: 0 | Status: SHIPPED | OUTPATIENT
Start: 2019-02-09 | End: 2019-02-19

## 2019-02-09 NOTE — PROGRESS NOTES
Deja Whitaker is a 53year old female. Patient presents with:  Cough: per pt, mucous    HPI:   Joce Sunshine presents to the office with complaints of upper respiratory tract infection. 1/26: seen in the office, started doxycycline, finished that. tablet Rfl: 1   benzonatate 100 MG Oral Cap Take 1 capsule (100 mg total) by mouth 2 (two) times daily as needed for cough.  Disp: 14 capsule Rfl: 0        Penicillins                Past Medical History:   Diagnosis Date   • Chronic neck and back pain Sinusitis. PLAN: Levaquin 500 mg po qd x 10 days. Also advised to continue supportive care with drinking lots of water, getting more rest, mucinex for congestion and tylenol or motrin for pain.  Honey, chloraseptic spray or lozenges can help with sore thr

## 2019-02-26 ENCOUNTER — TELEPHONE (OUTPATIENT)
Dept: FAMILY MEDICINE CLINIC | Facility: CLINIC | Age: 49
End: 2019-02-26

## 2019-02-26 NOTE — TELEPHONE ENCOUNTER
Has an appt at the [place] office for sleep issues-wants to be seen sooner here in [place]. Also wants to know if it is appropriate to see Dr Emma Smart since she does not have sleep apnea, just sleep issues. Please call back.

## 2019-02-26 NOTE — TELEPHONE ENCOUNTER
Appointment given for patient to see Dr. Arnold Willis for sleep consult here at [place] office    Your Appointments    Saturday March 23, 2019 11:00 AM CDT  Sleep Consult with Elvis Rizvi MD  [address] [place] [address] Chinyere Parks

## 2019-02-28 ENCOUNTER — OFFICE VISIT (OUTPATIENT)
Dept: FAMILY MEDICINE CLINIC | Facility: CLINIC | Age: 49
End: 2019-02-28
Payer: COMMERCIAL

## 2019-02-28 VITALS
SYSTOLIC BLOOD PRESSURE: 122 MMHG | DIASTOLIC BLOOD PRESSURE: 78 MMHG | OXYGEN SATURATION: 99 % | HEART RATE: 72 BPM | TEMPERATURE: 98 F | WEIGHT: 254 LBS | BODY MASS INDEX: 43.36 KG/M2 | HEIGHT: 64 IN

## 2019-02-28 DIAGNOSIS — J30.9 ALLERGIC SINUSITIS: Primary | ICD-10-CM

## 2019-02-28 DIAGNOSIS — N91.1 SECONDARY AMENORRHEA: ICD-10-CM

## 2019-02-28 PROCEDURE — 99214 OFFICE O/P EST MOD 30 MIN: CPT | Performed by: FAMILY MEDICINE

## 2019-02-28 RX ORDER — PREDNISONE 20 MG/1
40 TABLET ORAL DAILY
Qty: 14 TABLET | Refills: 0 | Status: SHIPPED | OUTPATIENT
Start: 2019-02-28 | End: 2019-03-07
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
## 2019-03-18 ENCOUNTER — TELEPHONE (OUTPATIENT)
Dept: FAMILY MEDICINE CLINIC | Facility: CLINIC | Age: 49
End: 2019-03-18

## 2019-03-18 NOTE — TELEPHONE ENCOUNTER
Letter mailed to patient reminding her she is due for labs.     Lab Frequency Next Occurrence   VITAMIN D, 25-HYDROXY Once 03/17/2019   VITAMIN B12 Once 03/17/2019

## 2019-03-22 ENCOUNTER — TELEPHONE (OUTPATIENT)
Dept: FAMILY MEDICINE CLINIC | Facility: CLINIC | Age: 49
End: 2019-03-22

## 2019-03-22 NOTE — TELEPHONE ENCOUNTER
Graciela Malone called from [address] [place] [address]- she states that she is looking up some allergy testing- and needs the CPT codes for the skin testing    Advised that this is not something we do in our office so we do not have the codes for that- this has to come from the dermat

## 2019-03-23 ENCOUNTER — OFFICE VISIT (OUTPATIENT)
Dept: FAMILY MEDICINE CLINIC | Facility: CLINIC | Age: 49
End: 2019-03-23
Payer: COMMERCIAL

## 2019-03-23 VITALS
HEIGHT: 64 IN | TEMPERATURE: 99 F | BODY MASS INDEX: 43.94 KG/M2 | DIASTOLIC BLOOD PRESSURE: 78 MMHG | HEART RATE: 80 BPM | RESPIRATION RATE: 16 BRPM | SYSTOLIC BLOOD PRESSURE: 124 MMHG | WEIGHT: 257.38 LBS

## 2019-03-23 DIAGNOSIS — E55.9 VITAMIN D DEFICIENCY: ICD-10-CM

## 2019-03-23 DIAGNOSIS — G47.10 SLEEP APNEA WITH HYPERSOMNOLENCE: ICD-10-CM

## 2019-03-23 DIAGNOSIS — E53.8 LOW SERUM VITAMIN B12: Primary | ICD-10-CM

## 2019-03-23 DIAGNOSIS — G47.30 SLEEP APNEA WITH HYPERSOMNOLENCE: ICD-10-CM

## 2019-03-23 DIAGNOSIS — G47.19 EXCESSIVE DAYTIME SLEEPINESS: ICD-10-CM

## 2019-03-23 DIAGNOSIS — G47.61 PLMD (PERIODIC LIMB MOVEMENT DISORDER): ICD-10-CM

## 2019-03-23 DIAGNOSIS — G47.8 UPPER AIRWAY RESISTANCE SYNDROME: ICD-10-CM

## 2019-03-23 PROCEDURE — 99244 OFF/OP CNSLTJ NEW/EST MOD 40: CPT | Performed by: FAMILY MEDICINE

## 2019-03-23 RX ORDER — ROPINIROLE 0.5 MG/1
0.5 TABLET, FILM COATED ORAL EVERY EVENING
Qty: 30 TABLET | Refills: 3 | Status: SHIPPED | OUTPATIENT
Start: 2019-03-23 | End: 2019-05-29

## 2019-03-23 NOTE — PROGRESS NOTES
[facility]  SLEEP PROGRESS NOTE        HPI:   This is a 53year old female coming in for Patient presents with:  Consult: Sleep Consult      HPI: pt states that she doesn't sleep well, and went to see the doctor and he didn't give her ve 35kg/mg2? 1   Age over 48years old? 0   Neck circumference >16 inches (40 cm)?  1   Gender Male? 0   Stop Bang Score 5   Obstructive Sleep Apnea Risk High Risk of PHYLLIS       No results found for: IRON, IRONTOT, TIBC, IRONSAT, TRANSFERRIN, TIBCP, IRONBIND, S morning before breakfast. Disp: 30 tablet Rfl: 1   TraZODone HCl 100 MG Oral Tab TAKE 1 TABLET BY MOUTH NIGHTLY (Patient taking differently: 50 mg.  TAKE 1 TABLET BY MOUTH NIGHTLY ) Disp: 90 tablet Rfl: 1      Counseling given: Not Answered         Problem Normocephalic and atraumatic. Right Ear: External ear normal.   Left Ear: External ear normal.   Nose: Nose normal.   Mouth/Throat: Oropharynx is clear and moist. No oropharyngeal exudate. Mal 4 tonsils 0.    Neck 16.5      Eyes: Conjunctivae and EOM ar My nurse will call you when your study is read and [facility].. Call [facility]. [phone] to  machine after called by our nurses.       Follow up with me 2 weeks after using  your new sleep therapy equipment (CPAP/BIPAP

## 2019-03-23 NOTE — PATIENT INSTRUCTIONS
recommend 7-9 hours nightly. Dr. Dante Francis's \" The immune system recovery plan\". Trial of avoidance of gluten, dairy, soy, corn and sugar x 3 months. Chocolate covered Whitney.      You have been scheduled for a CPAP titration at [address] [place]

## 2019-03-25 ENCOUNTER — TELEPHONE (OUTPATIENT)
Dept: FAMILY MEDICINE CLINIC | Facility: CLINIC | Age: 49
End: 2019-03-25

## 2019-03-25 RX ORDER — TRAZODONE HYDROCHLORIDE 100 MG/1
TABLET ORAL
Qty: 90 TABLET | Refills: 1 | Status: SHIPPED | OUTPATIENT
Start: 2019-03-25 | End: 2020-05-29

## 2019-03-25 NOTE — TELEPHONE ENCOUNTER
Spoke with the pt and she would like the to try and get the cost of the allergen panel before she will do it.  I tried the price  tool and the lab I am looking for is not available  Advised the pt that I will send an email to that dept tosee if I c

## 2019-03-25 NOTE — TELEPHONE ENCOUNTER
I put this in my last note: \"she'll look into insurance in regards to allergy testing; let me know if meds don't help or if she'd like to proceed with allergy testing\" so maybe she's referring to allergy test code?

## 2019-03-25 NOTE — TELEPHONE ENCOUNTER
Patient would like to speak with Dr Adam Stanley as she has some questions for her. States that the code that she gave her is not correct. Please call patient back to discuss.

## 2019-03-25 NOTE — TELEPHONE ENCOUNTER
Last refilled on 2/28/18 for # 90 with 1 refills  Last OV 2/28/19  Future Appointments   Date Time Provider Antonia Keene   4/11/2019 10:50 AM Jaky Ryan MD [facility] EMG Zul[facility] Mejia   4/19/2019  9:00 PM [address] [facility]        Thank you.

## 2019-03-26 NOTE — TELEPHONE ENCOUNTER
Pt called back and advised of the estimated cost of the [facility] 8 allergy panel.- she v/u she is going to try and call insurance again

## 2019-03-26 NOTE — TELEPHONE ENCOUNTER
The self-pay estimate for these labs are $3319.00. The pt would be required to put 25% down $829.       Left message for the pt to call back

## 2019-03-28 ENCOUNTER — NURSE ONLY (OUTPATIENT)
Dept: FAMILY MEDICINE CLINIC | Facility: CLINIC | Age: 49
End: 2019-03-28

## 2019-03-28 DIAGNOSIS — E55.9 VITAMIN D INSUFFICIENCY: ICD-10-CM

## 2019-03-28 DIAGNOSIS — G47.61 PLMD (PERIODIC LIMB MOVEMENT DISORDER): ICD-10-CM

## 2019-03-28 DIAGNOSIS — R53.83 OTHER FATIGUE: ICD-10-CM

## 2019-03-28 LAB
DEPRECATED HBV CORE AB SER IA-ACNC: 53.2 NG/ML (ref 12–240)
IRON SATURATION: 15 % (ref 15–50)
IRON SERPL-MCNC: 61 UG/DL (ref 50–170)
TOTAL IRON BINDING CAPACITY: 414 UG/DL (ref 240–450)
TRANSFERRIN SERPL-MCNC: 278 MG/DL (ref 200–360)
VIT B12 SERPL-MCNC: 558 PG/ML (ref 193–986)
VIT D+METAB SERPL-MCNC: 30.2 NG/ML (ref 30–100)

## 2019-03-28 PROCEDURE — 82728 ASSAY OF FERRITIN: CPT | Performed by: FAMILY MEDICINE

## 2019-03-28 PROCEDURE — 82607 VITAMIN B-12: CPT | Performed by: FAMILY MEDICINE

## 2019-03-28 PROCEDURE — 82306 VITAMIN D 25 HYDROXY: CPT | Performed by: FAMILY MEDICINE

## 2019-03-28 PROCEDURE — 36415 COLL VENOUS BLD VENIPUNCTURE: CPT | Performed by: FAMILY MEDICINE

## 2019-03-28 PROCEDURE — 83550 IRON BINDING TEST: CPT | Performed by: FAMILY MEDICINE

## 2019-03-28 PROCEDURE — 83540 ASSAY OF IRON: CPT | Performed by: FAMILY MEDICINE

## 2019-03-28 NOTE — PROGRESS NOTES
Patient presented today for lab draw.  1 gold, 2 mint tube(s) drawn from right hand area x1 with butterfly needle. Patient was in supine position. Was given water and peanut butter cookies (confirmed no allergies on file and verbally with patient).   Deborah

## 2019-03-29 ENCOUNTER — TELEPHONE (OUTPATIENT)
Dept: FAMILY MEDICINE CLINIC | Facility: CLINIC | Age: 49
End: 2019-03-29

## 2019-03-29 NOTE — TELEPHONE ENCOUNTER
----- Message from Frank Hoang MD sent at 3/29/2019  8:28 AM CDT -----  ferritin is low a measure of iron stores.   Recommend MVI with iron by mouth two times a day x 3  month and recheck iron profile and ferritin   [facility] sent

## 2019-04-17 ENCOUNTER — TELEPHONE (OUTPATIENT)
Dept: FAMILY MEDICINE CLINIC | Facility: CLINIC | Age: 49
End: 2019-04-17

## 2019-04-17 NOTE — TELEPHONE ENCOUNTER
Patient is having a sleep study soon, has question in regard to a medication that she recently started (patient didn't know the name of the medication) patient wants to know if she should take that medication after the test? Would like a call back.

## 2019-04-17 NOTE — TELEPHONE ENCOUNTER
Patient states she is having her sleep study done on Friday night. Patient wondering if she should take the Trazodone and Ropinirole that night or hold off until after the sleep study? OTW for Dr. Russ Alex tomorrow. Please advise.

## 2019-04-19 ENCOUNTER — OFFICE VISIT (OUTPATIENT)
Dept: SLEEP CENTER | Age: 49
End: 2019-04-19
Attending: FAMILY MEDICINE
Payer: COMMERCIAL

## 2019-04-19 DIAGNOSIS — G47.8 UPPER AIRWAY RESISTANCE SYNDROME: ICD-10-CM

## 2019-04-19 DIAGNOSIS — G47.10 SLEEP APNEA WITH HYPERSOMNOLENCE: ICD-10-CM

## 2019-04-19 DIAGNOSIS — G47.30 SLEEP APNEA WITH HYPERSOMNOLENCE: ICD-10-CM

## 2019-04-19 DIAGNOSIS — G47.61 PLMD (PERIODIC LIMB MOVEMENT DISORDER): ICD-10-CM

## 2019-04-19 PROCEDURE — 95811 POLYSOM 6/>YRS CPAP 4/> PARM: CPT

## 2019-04-22 ENCOUNTER — SLEEP STUDY (OUTPATIENT)
Dept: FAMILY MEDICINE CLINIC | Facility: CLINIC | Age: 49
End: 2019-04-22
Payer: COMMERCIAL

## 2019-04-22 DIAGNOSIS — G47.33 OBSTRUCTIVE SLEEP APNEA: Primary | ICD-10-CM

## 2019-04-22 PROCEDURE — 95811 POLYSOM 6/>YRS CPAP 4/> PARM: CPT | Performed by: FAMILY MEDICINE

## 2019-04-23 NOTE — PROCEDURES
[address] [address],[facility] [address]       Accredited by the [facility] (AASM)    PATIENT'S NAME:        [facility]  ATTENDING PHYSICIAN:   Ayana Colunga MD  REFERRING PHYSICIAN:   Chintan Colunga, the final pressure of 10 cm of water, the patient had an apnea-hypopnea of 0, a sleep efficiency of 98.5%, and a minimum oxygen saturation of 92%. Patient had no significant central sleep apnea. Baseline oxygen saturation of the study was 95.8.   The [facility] Awakening Index: 4.5   Sleep Efficiency: 82.8% PLM Index: 0.6   RERA Count: - RERA Index: -   RDI: 0.8 Sa02 Pérez: 91.0%     COMMENTS    Patient arrived at the [facility] at 8:45pm for a CPAP Titration study.  Patient was oriented to testing proced 0. 2   With PLMs - - - - - -   With Isolated Limb Movements 1 0.2 - - 1 0.2   With Total Limb Movements 1 0.2 - - 1 0.2   With Snoring 7 1.4 - - 7 1.1   Spontaneous 26 5.2 1 0.7 43 6.7   Total 35 7.0 1 0.7 52 8.1       OXYGEN SATURATION SUMMARY     Wake REM (min) REM (min) NREM (min) Wake (min) Apnea Index Hypop Index AHI Obs Apnea Central AHI Sleep Eff% Min OSat   5 34.0 0.0 3.5 30.5 - 17.1 17.1 - - 10.3% 93.0   6 76.5 0.0 54.0 22.5 - - - - - 70.6% 81.0   7 147.5 22.0 123.0 2.5 - - - - - 98.3% 94.0   8 97.5

## 2019-04-25 NOTE — PROGRESS NOTES
[place] CPAP at 10  Recommend routine follow up to assure compliance and efficacy. Avoid alcohol, sedatives and other cns depressants that may worsen sleep apnea and disrupt normal sleep architecture.   Sleep hygiene should be review to assess factors th

## 2019-04-26 ENCOUNTER — TELEPHONE (OUTPATIENT)
Dept: FAMILY MEDICINE CLINIC | Facility: CLINIC | Age: 49
End: 2019-04-26

## 2019-04-26 DIAGNOSIS — G47.33 OSA (OBSTRUCTIVE SLEEP APNEA): Primary | ICD-10-CM

## 2019-04-26 NOTE — TELEPHONE ENCOUNTER
----- Message from Kaylin Moore MD sent at 4/25/2019  1:57 PM CDT -----  [place] CPAP at 10  Recommend routine follow up to assure compliance and efficacy.   Avoid alcohol, sedatives and other cns depressants that may worsen sleep apnea and disrupt bruna

## 2019-04-30 NOTE — TELEPHONE ENCOUNTER
Sleep study results entered into the flowsheet. Order pended. Please sign and send back to me so I can contact patient. Thank you.

## 2019-05-01 NOTE — TELEPHONE ENCOUNTER
Spoke with patient and she was notified that order for CPAP has been completed as per Dr. Leonard Cat, order faxed to [facility], and pt needs to call [facility] to schedule appt in the next day or two to set up machine.  Pt was also advised to schedule an appointment f

## 2019-05-29 ENCOUNTER — OFFICE VISIT (OUTPATIENT)
Dept: FAMILY MEDICINE CLINIC | Facility: CLINIC | Age: 49
End: 2019-05-29
Payer: COMMERCIAL

## 2019-05-29 VITALS
TEMPERATURE: 98 F | BODY MASS INDEX: 44 KG/M2 | RESPIRATION RATE: 16 BRPM | DIASTOLIC BLOOD PRESSURE: 72 MMHG | SYSTOLIC BLOOD PRESSURE: 110 MMHG | WEIGHT: 256.81 LBS | HEART RATE: 78 BPM

## 2019-05-29 DIAGNOSIS — L23.7 POISON IVY: Primary | ICD-10-CM

## 2019-05-29 DIAGNOSIS — L91.8 SKIN TAG: ICD-10-CM

## 2019-05-29 DIAGNOSIS — F51.04 CHRONIC INSOMNIA: ICD-10-CM

## 2019-05-29 DIAGNOSIS — Z71.3 WEIGHT LOSS COUNSELING, ENCOUNTER FOR: ICD-10-CM

## 2019-05-29 PROCEDURE — 99214 OFFICE O/P EST MOD 30 MIN: CPT | Performed by: FAMILY MEDICINE

## 2019-05-29 RX ORDER — PHENTERMINE HYDROCHLORIDE 37.5 MG/1
37.5 TABLET ORAL
Qty: 30 TABLET | Refills: 1 | Status: SHIPPED
Start: 2019-05-29 | End: 2020-10-05

## 2019-05-29 RX ORDER — BUPROPION HYDROCHLORIDE 150 MG/1
150 TABLET ORAL DAILY
Qty: 90 TABLET | Refills: 1 | Status: SHIPPED | OUTPATIENT
Start: 2019-05-29 | End: 2020-11-09

## 2019-05-29 RX ORDER — ROPINIROLE 0.5 MG/1
0.5 TABLET, FILM COATED ORAL EVERY EVENING
Qty: 90 TABLET | Refills: 3 | Status: SHIPPED | OUTPATIENT
Start: 2019-05-29 | End: 2020-09-17

## 2019-05-29 NOTE — PROGRESS NOTES
Kasia Pugh is a 53year old female.   HPI:   Patient c/o several things;    1)  Poison ivy on arm for a few weeks a bit itchy, no treatments tried  2) would like help with weight loss again, likes the phentermine, helps with appetite control Smoking status: Former Smoker        Packs/day: 1.00        Years: 24.00        Pack years: 24        Types: Cigarettes        Start date: 10/21/2016      Smokeless tobacco: Never Used    Alcohol use:  Yes      Alcohol/week: 0.0 oz      Comment: rare     Dr hours prior to bedtime.  -     buPROPion HCl ER, XL, 150 MG Oral Tablet 24 Hr; Take 1 tablet (150 mg total) by mouth daily. The patient indicates understanding of these issues and agrees to the plan. The patient is asked to return in 2months.

## 2019-06-03 ENCOUNTER — TELEPHONE (OUTPATIENT)
Dept: FAMILY MEDICINE CLINIC | Facility: CLINIC | Age: 49
End: 2019-06-03

## 2019-06-07 NOTE — TELEPHONE ENCOUNTER
Future Appointments   Date Time Provider Antonia Keene   6/29/2019  8:00 AM Marely Peña APRN [facility] [place] [facility]

## 2019-07-06 ENCOUNTER — TELEPHONE (OUTPATIENT)
Dept: FAMILY MEDICINE CLINIC | Facility: CLINIC | Age: 49
End: 2019-07-06

## 2019-07-06 NOTE — TELEPHONE ENCOUNTER
Pt called in this morning to notify office that she missed her appt this morning. She was scheduled to be seen with Ramona and was a no show. Pt was explained the no show policy and $93 no show fee by Alisha So, [place].  Pt expressed understanding and stat

## 2019-08-06 ENCOUNTER — OFFICE VISIT (OUTPATIENT)
Dept: FAMILY MEDICINE CLINIC | Facility: CLINIC | Age: 49
End: 2019-08-06
Payer: COMMERCIAL

## 2019-08-06 VITALS
WEIGHT: 262.63 LBS | HEART RATE: 88 BPM | BODY MASS INDEX: 44.84 KG/M2 | SYSTOLIC BLOOD PRESSURE: 124 MMHG | HEIGHT: 64 IN | TEMPERATURE: 98 F | DIASTOLIC BLOOD PRESSURE: 78 MMHG | RESPIRATION RATE: 16 BRPM

## 2019-08-06 DIAGNOSIS — E53.8 LOW SERUM VITAMIN B12: Primary | ICD-10-CM

## 2019-08-06 DIAGNOSIS — G47.33 OSA (OBSTRUCTIVE SLEEP APNEA): ICD-10-CM

## 2019-08-06 DIAGNOSIS — E55.9 VITAMIN D DEFICIENCY: ICD-10-CM

## 2019-08-06 DIAGNOSIS — G47.61 PLMD (PERIODIC LIMB MOVEMENT DISORDER): ICD-10-CM

## 2019-08-06 PROCEDURE — 99214 OFFICE O/P EST MOD 30 MIN: CPT | Performed by: FAMILY MEDICINE

## 2019-08-06 NOTE — PATIENT INSTRUCTIONS
Iron stores are low. [employer]. net  (0528 access number) for vitamins    Iron plus c bid x 3  Months and recheck iron. This may be contributing to RLS and PLMD.   Goal to treat to  Ferritin of 50 and Iron Saturation of 35%.   Vitamin D is sub thera

## 2019-08-06 NOTE — PROGRESS NOTES
[facility]  [facility]        HPI:   This is a 51year old female coming in for No chief complaint on file. HPI: pt states that sometimes she feels like she cannot breath as she is too mucousy.  She notes that if her nose is doors)? - -   Do you often feel tired, fatigued, or sleepy during the day? - -   Has anyone observed you stop breathing during your sleep? - -   Do you have or are you being treated for high blood pressure? - -   BMI more than 35kg/mg2?  - -   Age over 48 y prior to bedtime. Disp: 90 tablet Rfl: 3   TRAZODONE  MG Oral Tab TAKE 1 TABLET BY MOUTH NIGHTLY Disp: 90 tablet Rfl: 1   Cyanocobalamin (VITAMIN B12 OR) Take by mouth.  Disp:  Rfl:    IRON OR  Disp:  Rfl:    Phentermine HCl 37.5 MG Oral Tab Take 1 t Encounters:  08/06/19 : 262 lb 9.6 oz  05/29/19 : 256 lb 12.8 oz  03/23/19 : 257 lb 6.4 oz  02/28/19 : 254 lb  02/09/19 : 250 lb  01/26/19 : 253 lb    BP Readings from Last 3 Encounters:  08/06/19 : 124/78  05/29/19 : 110/72  03/23/19 : 124/78    Ideal bod be contributing to RLS and PLMD.   Goal to treat to  Ferritin of 50 and Iron Saturation of 35%. Vitamin D is sub therapeutic. Recommend 2000 units of vitamin D daily, increase if already taking.    Goal of 51 for vitamin D   Recheck vitamin D level in 6 provider. Meds & Refills for this Visit:  Requested Prescriptions      No prescriptions requested or ordered in this encounter       Outcome: Parent verbalizes understanding.  Parent is notified to call with any questions, complications, allergies,

## 2019-08-15 ENCOUNTER — TELEPHONE (OUTPATIENT)
Dept: FAMILY MEDICINE CLINIC | Facility: CLINIC | Age: 49
End: 2019-08-15

## 2019-08-15 NOTE — TELEPHONE ENCOUNTER
Pt called, would like to talk to someone about getting some medication-not a refill. Please call pt at [phone]. Pt did not want to discuss what type of medication-only with a nurse.

## 2019-08-15 NOTE — TELEPHONE ENCOUNTER
Spoke with patient who states her mother is having surgery next tuesday and there is a 1 out of 2 chance that she will die. Patient wants to know if something can be given to help her during this time.  Patient states she is spending as much time with her m

## 2019-08-15 NOTE — TELEPHONE ENCOUNTER
Patient states that she feels like it is more for anxiety. She is not sure what a panic attack is. She states that she cries numerous times throughout the day. Everything is becoming very overwhelming with her mom and a lot of stress from work.  She states

## 2019-08-15 NOTE — TELEPHONE ENCOUNTER
Best wishes to her and her mom, so sorry to hear about the possible loss. What is she looking for help with? Insomnia? Panic attacks? Something else?

## 2019-08-16 RX ORDER — CLONAZEPAM 0.5 MG/1
TABLET ORAL
Qty: 30 TABLET | Refills: 0 | Status: SHIPPED | OUTPATIENT
Start: 2019-08-16 | End: 2020-10-05

## 2019-08-16 NOTE — TELEPHONE ENCOUNTER
What I have to offer are short term sedatives, like clonazepam, if she'd like. These are potentially habit forming (even in well meaning people with no intentioN) so we use with caution and short term. They could make her sleepy.   If she'd like to try it

## 2019-08-26 ENCOUNTER — OFFICE VISIT (OUTPATIENT)
Dept: FAMILY MEDICINE CLINIC | Facility: CLINIC | Age: 49
End: 2019-08-26
Payer: COMMERCIAL

## 2019-08-26 VITALS
TEMPERATURE: 97 F | WEIGHT: 261 LBS | DIASTOLIC BLOOD PRESSURE: 80 MMHG | HEIGHT: 64 IN | SYSTOLIC BLOOD PRESSURE: 130 MMHG | RESPIRATION RATE: 20 BRPM | HEART RATE: 73 BPM | OXYGEN SATURATION: 96 % | BODY MASS INDEX: 44.56 KG/M2

## 2019-08-26 DIAGNOSIS — R09.81 NASAL CONGESTION: ICD-10-CM

## 2019-08-26 DIAGNOSIS — L23.7 POISON IVY: Primary | ICD-10-CM

## 2019-08-26 PROCEDURE — 99213 OFFICE O/P EST LOW 20 MIN: CPT | Performed by: FAMILY MEDICINE

## 2019-08-26 RX ORDER — PREDNISONE 20 MG/1
TABLET ORAL
Qty: 18 TABLET | Refills: 0 | Status: SHIPPED | OUTPATIENT
Start: 2019-08-26 | End: 2019-09-04

## 2019-08-26 NOTE — PROGRESS NOTES
Kasia Pugh is a 51year old female. HPI:   Patient here for eval of what she suspects is poison ivy. Has had it before, seems the same[place] regularly. No fever/schills. Very itchy rash.  B • Cancer Other         breast--~age 49??   • Cancer Paternal Grandmother         metastatic, uncertain primary   • Diabetes Maternal Uncle       Social History    Tobacco Use      Smoking status: Former Smoker        Packs/day: 1.00        Years: 24.00

## 2019-10-01 ENCOUNTER — TELEPHONE (OUTPATIENT)
Dept: FAMILY MEDICINE CLINIC | Facility: CLINIC | Age: 49
End: 2019-10-01

## 2019-10-01 NOTE — TELEPHONE ENCOUNTER
regarding a full face mask, Air Fit F30, woke up this morning teeth hurting, please give her a call back

## 2019-10-03 NOTE — TELEPHONE ENCOUNTER
Pt contacted and informed per Dr. Messi Heath that the possible issue could be the mask for cpap equipment. Pt stated that she did receive a new mask from [employer]. Pt stated that her front teeth are the current issue.  Pt stated that she does have appt with [facility]

## 2019-11-11 ENCOUNTER — MED REC SCAN ONLY (OUTPATIENT)
Dept: FAMILY MEDICINE CLINIC | Facility: CLINIC | Age: 49
End: 2019-11-11

## 2020-03-04 ENCOUNTER — OFFICE VISIT (OUTPATIENT)
Dept: FAMILY MEDICINE CLINIC | Facility: CLINIC | Age: 50
End: 2020-03-04
Payer: COMMERCIAL

## 2020-03-04 VITALS
TEMPERATURE: 98 F | SYSTOLIC BLOOD PRESSURE: 122 MMHG | BODY MASS INDEX: 45 KG/M2 | HEART RATE: 94 BPM | WEIGHT: 261 LBS | DIASTOLIC BLOOD PRESSURE: 70 MMHG | RESPIRATION RATE: 18 BRPM | OXYGEN SATURATION: 94 %

## 2020-03-04 DIAGNOSIS — J01.40 ACUTE NON-RECURRENT PANSINUSITIS: Primary | ICD-10-CM

## 2020-03-04 DIAGNOSIS — R10.31 GROIN PAIN, RIGHT: ICD-10-CM

## 2020-03-04 PROCEDURE — 99214 OFFICE O/P EST MOD 30 MIN: CPT | Performed by: FAMILY MEDICINE

## 2020-03-04 RX ORDER — AZITHROMYCIN 250 MG/1
TABLET, FILM COATED ORAL
COMMUNITY
Start: 2020-03-02 | End: 2020-10-05 | Stop reason: ALTCHOICE
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
## 2020-03-05 ENCOUNTER — TELEPHONE (OUTPATIENT)
Dept: FAMILY MEDICINE CLINIC | Facility: CLINIC | Age: 50
End: 2020-03-05

## 2020-03-05 NOTE — TELEPHONE ENCOUNTER
PT CALLED AND ADV NEEDS A NOTE TO RETURN TO WORK.   DIDN'T GO BACK TO WORK, NEEDS NOTE BEFORE HAND    PLEASE CALL WHEN READY     THANK YOU

## 2020-03-05 NOTE — TELEPHONE ENCOUNTER
Spoke with the pt and she needs a note to return to work  Has been out [address]- Thursday of this week she wants to go back tomorrow      She states that her ears are hurting more today- she did use flonase once yesterday wonders if she should continue this  H

## 2020-03-07 ENCOUNTER — OFFICE VISIT (OUTPATIENT)
Dept: FAMILY MEDICINE CLINIC | Facility: CLINIC | Age: 50
End: 2020-03-07
Payer: COMMERCIAL

## 2020-03-07 ENCOUNTER — TELEPHONE (OUTPATIENT)
Dept: FAMILY MEDICINE CLINIC | Facility: CLINIC | Age: 50
End: 2020-03-07

## 2020-03-07 VITALS
BODY MASS INDEX: 46 KG/M2 | OXYGEN SATURATION: 99 % | WEIGHT: 266.81 LBS | RESPIRATION RATE: 18 BRPM | DIASTOLIC BLOOD PRESSURE: 86 MMHG | TEMPERATURE: 98 F | HEART RATE: 84 BPM | SYSTOLIC BLOOD PRESSURE: 130 MMHG

## 2020-03-07 DIAGNOSIS — R05.9 COUGH: ICD-10-CM

## 2020-03-07 DIAGNOSIS — R06.00 DYSPNEA ON EXERTION: ICD-10-CM

## 2020-03-07 DIAGNOSIS — J98.01 BRONCHOSPASM: Primary | ICD-10-CM

## 2020-03-07 DIAGNOSIS — J06.9 VIRAL URI: ICD-10-CM

## 2020-03-07 PROCEDURE — 99214 OFFICE O/P EST MOD 30 MIN: CPT | Performed by: FAMILY MEDICINE

## 2020-03-07 RX ORDER — ALBUTEROL SULFATE 2.5 MG/3ML
2.5 SOLUTION RESPIRATORY (INHALATION) EVERY 6 HOURS PRN
Qty: 75 ML | Refills: 1 | Status: SHIPPED | OUTPATIENT
Start: 2020-03-07 | End: 2020-10-05

## 2020-03-07 RX ORDER — PREDNISONE 10 MG/1
TABLET ORAL
Qty: 18 TABLET | Refills: 0 | Status: SHIPPED | OUTPATIENT
Start: 2020-03-07 | End: 2020-10-05 | Stop reason: ALTCHOICE

## 2020-03-07 NOTE — TELEPHONE ENCOUNTER
Pt called, is still not feeling any better and would like to discuss.    Please call pt at [phone]

## 2020-03-07 NOTE — PROGRESS NOTES
HPI:   Lindy Mcknight is a 51year old female who presents for upper respiratory symptoms for  10  days. Patient reports sore throat, congestion, yellow colored nasal discharge, cough with yellow colored sputum, sinus pain.     Current Outpatien Relation Age of Onset   • Heart Disorder Mother         CVA   • Cancer Mother         uterine (or cervical?) and skin--TALIA   • Cancer Other         breast--~age 48??   • Cancer Paternal Grandmother         metastatic, uncertain primary   • Diabetes Materna total) by nebulization every 6 (six) hours as needed for Wheezing.    • predniSONE 10 MG Oral Tab 18 tablet 0     Sig: 3 pills for 3 days, 2 pills for 3 days, then one pill daily for 3 days       Imaging & Consults:  None

## 2020-03-07 NOTE — TELEPHONE ENCOUNTER
Spoke with pt- she finished ABX yesterday.     Pt states sxs did not improve while on medication    Pt states  also got sick as well- and she thinks she started getting better but  got her sick all over again    Pt states she is coughing a lot

## 2020-03-10 ENCOUNTER — TELEPHONE (OUTPATIENT)
Dept: FAMILY MEDICINE CLINIC | Facility: CLINIC | Age: 50
End: 2020-03-10

## 2020-03-10 NOTE — TELEPHONE ENCOUNTER
[address] [place] [address] [phone], [phone]    Pt is looking for something to help dry up her fleam.

## 2020-03-10 NOTE — TELEPHONE ENCOUNTER
I would stay the course, have seen coughing spells last for weeks after infection this winter but she can try sudafed and/or mucinex for the phlegm during day and benadryl at bedtime.   If fever, chills, getting worse should be re-evaluated

## 2020-03-10 NOTE — TELEPHONE ENCOUNTER
Patient was seen on 3/7/20 by Dr. Ervin Ramirez. On day #4 of steroid. Threw up a little last night from coughing so much. Doing 4 breathing treatments per day since Saturday. Not sure if she should be taking something to dry out the phlegm.   Will start to think

## 2020-03-25 ENCOUNTER — TELEPHONE (OUTPATIENT)
Dept: FAMILY MEDICINE CLINIC | Facility: CLINIC | Age: 50
End: 2020-03-25

## 2020-03-25 RX ORDER — DOXYCYCLINE HYCLATE 100 MG/1
100 CAPSULE ORAL 2 TIMES DAILY
Qty: 14 CAPSULE | Refills: 0 | Status: SHIPPED | OUTPATIENT
Start: 2020-03-25 | End: 2020-04-01

## 2020-03-25 NOTE — TELEPHONE ENCOUNTER
\"She did breathing tx. Not doing breathing treatment currently. \"  Please clarify :)  If she's been doing albuteorl every 4 hours and still having chest symptoms we can try duoneb.    If she hasn't been doing the albuterol for her chest symptoms every 4 ho

## 2020-03-25 NOTE — TELEPHONE ENCOUNTER
Spoke with the pt and she is not using the albuterol she states that her chest symptoms are better.  Advised that Orquidea Roach will not call in the duo neb since she is not having chest symptoms    Asked about the flonase and freddieyl- she states that she has be

## 2020-03-25 NOTE — TELEPHONE ENCOUNTER
Pt called, Doesn't think she got over the sinus infection she had a couple of weeks ago. She never recovered 100%. Pt would like to discuss.   Please call pt at [phone]

## 2020-03-25 NOTE — TELEPHONE ENCOUNTER
Spoke with the pt and she states that her chest felt like it still had pressure- never recovered 100%- the very middle of the chest- not quite as heavy as before  She did breathing tx    Not doing breathing tx currently- she states that the pressure is not

## 2020-05-29 RX ORDER — TRAZODONE HYDROCHLORIDE 100 MG/1
TABLET ORAL
Qty: 90 TABLET | Refills: 0 | Status: SHIPPED | OUTPATIENT
Start: 2020-05-29 | End: 2020-10-21

## 2020-05-29 NOTE — TELEPHONE ENCOUNTER
TRAZODONE  MG Oral Tab    Last refilled: 3/25/19 - 90 tabs - 1 refill    Last OV: 3/7/20 - bronchospasm -     Last OV: 3/4/20 - pansinusitis -       No future appts    Please advise.

## 2020-06-26 ENCOUNTER — TELEPHONE (OUTPATIENT)
Dept: FAMILY MEDICINE CLINIC | Facility: CLINIC | Age: 50
End: 2020-06-26

## 2020-06-26 RX ORDER — PREDNISONE 20 MG/1
TABLET ORAL
Qty: 21 TABLET | Refills: 0 | Status: SHIPPED | OUTPATIENT
Start: 2020-06-26 | End: 2020-07-10

## 2020-06-26 NOTE — TELEPHONE ENCOUNTER
When was she on prednisone? How extensive is the rash and how long has she had it? Can [facility] send a pic via my chart?     CPAP supplies per sleep specialist.

## 2020-06-26 NOTE — TELEPHONE ENCOUNTER
Patient states that she doesn't know how to send pictures thru my-chart. Patient was put on prednisone thru [employer] thru work - She was on prednisone for 5 days- she has been off for 4- 5 days now and and the rash is back.  It's on her side - the one spot

## 2020-06-26 NOTE — TELEPHONE ENCOUNTER
Pt called she has poison ivy. On her arm, side, and on leg(raised and gooey). Pt was on prednisone, it seemed to help but didn't seem to take it away. Pt is also needs all her CPAP equipment and wants to know if  could write a script for that.

## 2020-06-26 NOTE — TELEPHONE ENCOUNTER
Routing to Dr. Swetha Osuna. Please advise. CPAP supplies last ordered by Dr. Zeina Ch to [facility].

## 2020-06-26 NOTE — TELEPHONE ENCOUNTER
Emery. Sometimes for poision ivy we need extended prednisone dose otherwise it just rebound flares right back up.   I sent script for 2 weeks

## 2020-09-16 ENCOUNTER — TELEPHONE (OUTPATIENT)
Dept: FAMILY MEDICINE CLINIC | Facility: CLINIC | Age: 50
End: 2020-09-16

## 2020-09-16 NOTE — TELEPHONE ENCOUNTER
Future appt:     Your appointments     Date & Time Appointment Department [facility])    Oct 05, 2020  4:00 PM CDT Follow Up Visit with Tiarra Pino MD [address] [place] [address], [facility]            [employer]

## 2020-09-17 RX ORDER — ROPINIROLE 0.5 MG/1
0.5 TABLET, FILM COATED ORAL EVERY EVENING
Qty: 90 TABLET | Refills: 3 | Status: SHIPPED | OUTPATIENT
Start: 2020-09-17 | End: 2020-10-05

## 2020-10-05 ENCOUNTER — OFFICE VISIT (OUTPATIENT)
Dept: FAMILY MEDICINE CLINIC | Facility: CLINIC | Age: 50
End: 2020-10-05
Payer: COMMERCIAL

## 2020-10-05 VITALS
HEIGHT: 64 IN | OXYGEN SATURATION: 97 % | BODY MASS INDEX: 49.34 KG/M2 | HEART RATE: 86 BPM | RESPIRATION RATE: 18 BRPM | SYSTOLIC BLOOD PRESSURE: 116 MMHG | TEMPERATURE: 98 F | WEIGHT: 289 LBS | DIASTOLIC BLOOD PRESSURE: 70 MMHG

## 2020-10-05 DIAGNOSIS — G47.61 PLMD (PERIODIC LIMB MOVEMENT DISORDER): ICD-10-CM

## 2020-10-05 DIAGNOSIS — G47.33 OSA (OBSTRUCTIVE SLEEP APNEA): Primary | ICD-10-CM

## 2020-10-05 PROCEDURE — 3008F BODY MASS INDEX DOCD: CPT | Performed by: FAMILY MEDICINE

## 2020-10-05 PROCEDURE — 99214 OFFICE O/P EST MOD 30 MIN: CPT | Performed by: FAMILY MEDICINE

## 2020-10-05 PROCEDURE — 3078F DIAST BP <80 MM HG: CPT | Performed by: FAMILY MEDICINE

## 2020-10-05 PROCEDURE — 3074F SYST BP LT 130 MM HG: CPT | Performed by: FAMILY MEDICINE

## 2020-10-05 RX ORDER — HYDROXYZINE HYDROCHLORIDE 25 MG/1
25 TABLET, FILM COATED ORAL 4 TIMES DAILY
COMMUNITY
Start: 2020-08-01 | End: 2020-10-05

## 2020-10-05 RX ORDER — ROPINIROLE 0.5 MG/1
0.5 TABLET, FILM COATED ORAL EVERY EVENING
Qty: 90 TABLET | Refills: 3 | Status: SHIPPED | OUTPATIENT
Start: 2020-10-05 | End: 2021-01-19

## 2020-10-05 NOTE — PROGRESS NOTES
[facility]  [facility]        HPI:   This is a 53year old female coming in for Patient presents with:  Obstructive Sleep Apnea (PHYLLSI)  Follow - Up  Medication Follow-Up      HPI:  She is overall doing alright.   She notes [facility] (Family Practice)  Beth Woodard, CHELLY      Sleep Assessment 10/5/2020 8/6/2019   ESS 11 8   FSS 31 28   CDL no -   AHI Down Load 1 1.1   Compliant YES No   % from download 71.1 69.8%   LEAK 6 mins.  27secs 4mins 43secs   Sleep Duration 5hrs, 23mins, Cigarettes        Start date: 10/21/2016      Smokeless tobacco: Never Used    Alcohol use: Never      Alcohol/week: 0.0 standard drinks      Frequency: Never      Comment: rare     Drug use: No    Family History:  Family History   Problem Relation Age of Hematological: Negative. Psychiatric/Behavioral: Positive for sleep disturbance. All other systems reviewed and are negative.       EXAM:   /70   Pulse 86   Temp 97.7 °F (36.5 °C) (Temporal)   Resp 18   Ht 64\"   Wt 289 lb (131.1 kg)   LMP 06/0 sleep apnea)  - VITAMIN B12; Future  - COMP METABOLIC PANEL (14); Future  - IRON AND TIBC; Future  - FERRITIN; Future  - TSH W REFLEX TO FREE T4; Future   Doing well. /   download looks ok now with increasing fatigue. Await labs.     Discussed weight los the treatments as a result of today. \" This note was created utilizing Dragon speech recognition software.  Please excuse any grammatical errors. Call my office if you have any questions regarding this note.  \"     Jerome Rabago MD  10/5/2020  4:06 PM

## 2020-10-07 ENCOUNTER — TELEPHONE (OUTPATIENT)
Dept: FAMILY MEDICINE CLINIC | Facility: CLINIC | Age: 50
End: 2020-10-07

## 2020-10-07 DIAGNOSIS — Z13.1 DIABETES MELLITUS SCREENING: ICD-10-CM

## 2020-10-07 DIAGNOSIS — Z13.220 LIPID SCREENING: Primary | ICD-10-CM

## 2020-10-07 DIAGNOSIS — Z13.0 SCREENING, ANEMIA, DEFICIENCY, IRON: ICD-10-CM

## 2020-10-07 NOTE — TELEPHONE ENCOUNTER
Patient advised to fast. Verbalized understanding.      Future Appointments   Date Time Provider Antonia Keene   10/16/2020  4:00 PM EMG T[facility] ZARCO [facility] YASSINE Blandon   11/12/2020  4:20 PM Glenn Felix MD [facility] [place] [facility] [place]

## 2020-10-07 NOTE — TELEPHONE ENCOUNTER
Pt called she went to see Dr. Eliana Willett and she ordered lab work. Pt is wanting to know if Dr. Salvador Garay has any blood work that she needs done?   Pt is coming in 10/16

## 2020-10-09 ENCOUNTER — LAB ENCOUNTER (OUTPATIENT)
Dept: LAB | Age: 50
End: 2020-10-09
Attending: FAMILY MEDICINE
Payer: COMMERCIAL

## 2020-10-09 DIAGNOSIS — G47.33 OSA (OBSTRUCTIVE SLEEP APNEA): ICD-10-CM

## 2020-10-09 DIAGNOSIS — Z13.0 SCREENING, ANEMIA, DEFICIENCY, IRON: ICD-10-CM

## 2020-10-09 DIAGNOSIS — G47.61 PLMD (PERIODIC LIMB MOVEMENT DISORDER): ICD-10-CM

## 2020-10-09 DIAGNOSIS — Z13.1 DIABETES MELLITUS SCREENING: ICD-10-CM

## 2020-10-09 DIAGNOSIS — Z13.220 LIPID SCREENING: ICD-10-CM

## 2020-10-09 PROCEDURE — 83036 HEMOGLOBIN GLYCOSYLATED A1C: CPT | Performed by: FAMILY MEDICINE

## 2020-10-09 PROCEDURE — 83540 ASSAY OF IRON: CPT | Performed by: FAMILY MEDICINE

## 2020-10-09 PROCEDURE — 82728 ASSAY OF FERRITIN: CPT | Performed by: FAMILY MEDICINE

## 2020-10-09 PROCEDURE — 80050 GENERAL HEALTH PANEL: CPT | Performed by: FAMILY MEDICINE

## 2020-10-09 PROCEDURE — 80061 LIPID PANEL: CPT | Performed by: FAMILY MEDICINE

## 2020-10-09 PROCEDURE — 82607 VITAMIN B-12: CPT | Performed by: FAMILY MEDICINE

## 2020-10-09 PROCEDURE — 83550 IRON BINDING TEST: CPT | Performed by: FAMILY MEDICINE

## 2020-10-09 PROCEDURE — 36415 COLL VENOUS BLD VENIPUNCTURE: CPT | Performed by: FAMILY MEDICINE

## 2020-10-10 ENCOUNTER — TELEPHONE (OUTPATIENT)
Dept: FAMILY MEDICINE CLINIC | Facility: CLINIC | Age: 50
End: 2020-10-10

## 2020-10-10 NOTE — TELEPHONE ENCOUNTER
----- Message from Naveen Gutierrez MD sent at 10/10/2020  7:16 AM CDT -----  Ferritin is high but with a low iron saturation. This is likely inflammation in the setting of low iron stores. Recommend MVI with iron daily. Consider anti-inflammatory diet.

## 2020-10-10 NOTE — TELEPHONE ENCOUNTER
Patient informed of the below results and recommendations. Patient states she already takes a multivitamin with iron along with an iron supplement once a day. Patient will try taking the iron bid and f/u in 6 months.     Patient requesting an order for he

## 2020-10-10 NOTE — TELEPHONE ENCOUNTER
No order should be needed CMN signed every 6 months per protocol. Have patient call [facility]. And they will work it from there.

## 2020-10-19 ENCOUNTER — TELEPHONE (OUTPATIENT)
Dept: FAMILY MEDICINE CLINIC | Facility: CLINIC | Age: 50
End: 2020-10-19

## 2020-10-19 NOTE — TELEPHONE ENCOUNTER
[facility] in [place] never received the refill order for Trazodone HCI 100mg  Future Appointments   Date Time Provider Antonia Yecenia   11/3/2020  4:00 PM Jayson Hernandez MD [facility] EM[facility] Zulma [place]   11/12/2020  4:20 PM Jaky Ryan MD [facility] [place] [facility] [place]

## 2020-10-20 NOTE — TELEPHONE ENCOUNTER
Pt recently seen 10/5/20. Pt currently on  Requip and Trazodone- pt states both were ordered per Dr. Arnold Willis. Pt had recent renewal of Requip on 10/5.   Pt RX for Trazadone last approved per Dr. Arlette Choi (pt states that Dr. Arlette Choi is her PCP- was needing refill

## 2020-10-21 RX ORDER — TRAZODONE HYDROCHLORIDE 100 MG/1
100 TABLET ORAL NIGHTLY
Qty: 90 TABLET | Refills: 1 | Status: SHIPPED | OUTPATIENT
Start: 2020-10-21 | End: 2021-01-19

## 2020-10-21 NOTE — TELEPHONE ENCOUNTER
Patient informed of the below and was rescheduled for 6 months.      Future Appointments   Date Time Provider Antonia Keene   11/3/2020  4:00 PM Jazmyne Almaguer MD [facility] [place]   4/3/2021  8:00 AM Sandip Miranda MD [facility] [place] [facility] [place]     [place]

## 2020-10-21 NOTE — TELEPHONE ENCOUNTER
Please refill medications for 6 months. If patient is doing great can see her then. If she needs more refills, please send to pharmacy for 6 months in total.   Thanks.

## 2020-11-09 ENCOUNTER — OFFICE VISIT (OUTPATIENT)
Dept: FAMILY MEDICINE CLINIC | Facility: CLINIC | Age: 50
End: 2020-11-09
Payer: COMMERCIAL

## 2020-11-09 VITALS
TEMPERATURE: 98 F | WEIGHT: 291 LBS | BODY MASS INDEX: 50 KG/M2 | HEART RATE: 80 BPM | RESPIRATION RATE: 14 BRPM | SYSTOLIC BLOOD PRESSURE: 130 MMHG | OXYGEN SATURATION: 98 % | DIASTOLIC BLOOD PRESSURE: 70 MMHG

## 2020-11-09 DIAGNOSIS — M54.41 CHRONIC MIDLINE LOW BACK PAIN WITH BILATERAL SCIATICA: Primary | ICD-10-CM

## 2020-11-09 DIAGNOSIS — M54.42 CHRONIC MIDLINE LOW BACK PAIN WITH BILATERAL SCIATICA: Primary | ICD-10-CM

## 2020-11-09 DIAGNOSIS — R21 SKIN RASH: ICD-10-CM

## 2020-11-09 DIAGNOSIS — G89.29 CHRONIC MIDLINE LOW BACK PAIN WITH BILATERAL SCIATICA: Primary | ICD-10-CM

## 2020-11-09 DIAGNOSIS — F43.21 ADJUSTMENT DISORDER WITH DEPRESSED MOOD: ICD-10-CM

## 2020-11-09 DIAGNOSIS — Z23 NEED FOR VACCINATION: ICD-10-CM

## 2020-11-09 PROCEDURE — 90686 IIV4 VACC NO PRSV 0.5 ML IM: CPT | Performed by: FAMILY MEDICINE

## 2020-11-09 PROCEDURE — 3078F DIAST BP <80 MM HG: CPT | Performed by: FAMILY MEDICINE

## 2020-11-09 PROCEDURE — 99215 OFFICE O/P EST HI 40 MIN: CPT | Performed by: FAMILY MEDICINE

## 2020-11-09 PROCEDURE — 3075F SYST BP GE 130 - 139MM HG: CPT | Performed by: FAMILY MEDICINE

## 2020-11-09 PROCEDURE — 90471 IMMUNIZATION ADMIN: CPT | Performed by: FAMILY MEDICINE

## 2020-11-09 RX ORDER — BUPROPION HYDROCHLORIDE 150 MG/1
TABLET ORAL
Qty: 46 TABLET | Refills: 0 | Status: SHIPPED | OUTPATIENT
Start: 2020-11-09 | End: 2020-12-09
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
## 2020-12-09 ENCOUNTER — TELEPHONE (OUTPATIENT)
Dept: FAMILY MEDICINE CLINIC | Facility: CLINIC | Age: 50
End: 2020-12-09

## 2021-01-15 ENCOUNTER — ORDER TRANSCRIPTION (OUTPATIENT)
Dept: ADMINISTRATIVE | Facility: HOSPITAL | Age: 51
End: 2021-01-15

## 2021-01-15 DIAGNOSIS — M54.42 CHRONIC MIDLINE LOW BACK PAIN WITH BILATERAL SCIATICA: Primary | ICD-10-CM

## 2021-01-15 DIAGNOSIS — M54.41 CHRONIC MIDLINE LOW BACK PAIN WITH BILATERAL SCIATICA: Primary | ICD-10-CM

## 2021-01-15 DIAGNOSIS — G89.29 CHRONIC MIDLINE LOW BACK PAIN WITH BILATERAL SCIATICA: Primary | ICD-10-CM

## 2021-01-15 DIAGNOSIS — Z01.818 PREPROCEDURAL EXAMINATION: ICD-10-CM

## 2021-01-15 DIAGNOSIS — Z11.59 ENCOUNTER FOR SCREENING FOR OTHER VIRAL DISEASES: ICD-10-CM

## 2021-01-19 ENCOUNTER — OFFICE VISIT (OUTPATIENT)
Dept: FAMILY MEDICINE CLINIC | Facility: CLINIC | Age: 51
End: 2021-01-19
Payer: COMMERCIAL

## 2021-01-19 VITALS
WEIGHT: 293 LBS | OXYGEN SATURATION: 99 % | HEART RATE: 82 BPM | TEMPERATURE: 98 F | HEIGHT: 64 IN | SYSTOLIC BLOOD PRESSURE: 120 MMHG | DIASTOLIC BLOOD PRESSURE: 70 MMHG | BODY MASS INDEX: 50.02 KG/M2

## 2021-01-19 DIAGNOSIS — M54.41 CHRONIC MIDLINE LOW BACK PAIN WITH BILATERAL SCIATICA: ICD-10-CM

## 2021-01-19 DIAGNOSIS — M54.42 CHRONIC MIDLINE LOW BACK PAIN WITH BILATERAL SCIATICA: ICD-10-CM

## 2021-01-19 DIAGNOSIS — G89.29 CHRONIC MIDLINE LOW BACK PAIN WITH BILATERAL SCIATICA: ICD-10-CM

## 2021-01-19 DIAGNOSIS — F51.04 CHRONIC INSOMNIA: ICD-10-CM

## 2021-01-19 DIAGNOSIS — F40.240 CLAUSTROPHOBIA: ICD-10-CM

## 2021-01-19 DIAGNOSIS — J06.9 VIRAL UPPER RESPIRATORY TRACT INFECTION: ICD-10-CM

## 2021-01-19 DIAGNOSIS — Z01.818 PRE-OP EVALUATION: Primary | ICD-10-CM

## 2021-01-19 DIAGNOSIS — G47.61 PLMD (PERIODIC LIMB MOVEMENT DISORDER): ICD-10-CM

## 2021-01-19 DIAGNOSIS — G47.33 OSA (OBSTRUCTIVE SLEEP APNEA): ICD-10-CM

## 2021-01-19 PROCEDURE — 3078F DIAST BP <80 MM HG: CPT | Performed by: FAMILY MEDICINE

## 2021-01-19 PROCEDURE — 3074F SYST BP LT 130 MM HG: CPT | Performed by: FAMILY MEDICINE

## 2021-01-19 PROCEDURE — 99213 OFFICE O/P EST LOW 20 MIN: CPT | Performed by: FAMILY MEDICINE

## 2021-01-19 PROCEDURE — 3008F BODY MASS INDEX DOCD: CPT | Performed by: FAMILY MEDICINE

## 2021-01-19 RX ORDER — ROPINIROLE 0.5 MG/1
0.5 TABLET, FILM COATED ORAL EVERY EVENING
Qty: 90 TABLET | Refills: 3 | Status: SHIPPED | OUTPATIENT
Start: 2021-01-19 | End: 2021-02-23

## 2021-01-19 RX ORDER — GLUCOSAMINE/D3/BOSWELLIA SERRA 1500MG-400
1 TABLET ORAL DAILY
COMMUNITY

## 2021-01-19 RX ORDER — SODIUM CHLORIDE, SODIUM LACTATE, POTASSIUM CHLORIDE, CALCIUM CHLORIDE 600; 310; 30; 20 MG/100ML; MG/100ML; MG/100ML; MG/100ML
INJECTION, SOLUTION INTRAVENOUS CONTINUOUS
Status: CANCELLED | OUTPATIENT
Start: 2021-01-19

## 2021-01-19 RX ORDER — FLUTICASONE PROPIONATE 50 MCG
2 SPRAY, SUSPENSION (ML) NASAL DAILY
Qty: 1 BOTTLE | Refills: 3 | Status: SHIPPED | OUTPATIENT
Start: 2021-01-19 | End: 2021-04-23

## 2021-01-19 RX ORDER — TRAZODONE HYDROCHLORIDE 100 MG/1
100 TABLET ORAL NIGHTLY
Qty: 90 TABLET | Refills: 3 | Status: SHIPPED | OUTPATIENT
Start: 2021-01-19 | End: 2022-01-27

## 2021-01-19 NOTE — H&P
Simi Breen is a 53year old female who presents for a pre-MRI physical exam and clearance at the radiology dept's request.     Patient is to have MRI lumbar spine, to be done by rads dept + anesthesia at [facility] on 1/29/2021.       ] SURGICAL HISTORY      R foot surgery (? remove scar tissue)--done at [facility]   • TONSILLECTOMY      as a kid      Family History   Problem Relation Age of Onset   • Heart Disorder Mother         CVA   • Cancer Mother         uterine (or cervical? Pre-op labs: not indicated  Pre-op EKG: not indicated  Special precautions needed pre/intra/post-op: none      Pt's medical conditions are optimally managed and is cleared for upcoming proposed sedation for MRI    Diagnoses and all orders for this visit:

## 2021-01-20 ENCOUNTER — TELEPHONE (OUTPATIENT)
Dept: FAMILY MEDICINE CLINIC | Facility: CLINIC | Age: 51
End: 2021-01-20

## 2021-01-20 DIAGNOSIS — M54.41 CHRONIC MIDLINE LOW BACK PAIN WITH BILATERAL SCIATICA: Primary | ICD-10-CM

## 2021-01-20 DIAGNOSIS — G89.29 CHRONIC MIDLINE LOW BACK PAIN WITH BILATERAL SCIATICA: Primary | ICD-10-CM

## 2021-01-20 DIAGNOSIS — F40.240 CLAUSTROPHOBIA: ICD-10-CM

## 2021-01-20 DIAGNOSIS — M54.42 CHRONIC MIDLINE LOW BACK PAIN WITH BILATERAL SCIATICA: Primary | ICD-10-CM

## 2021-01-20 PROCEDURE — 93000 ELECTROCARDIOGRAM COMPLETE: CPT | Performed by: FAMILY MEDICINE

## 2021-01-20 NOTE — TELEPHONE ENCOUNTER
Order recevied and entered for EKG     please fax to pre admission [phone]    Future Appointments   Date Time Provider Antonia Keene   1/23/2021  8:30 AM  [facility] [address] EMG Della Chan   1/26/2021 11:30  [facility] POST [facility]UTE [facility]

## 2021-01-20 NOTE — TELEPHONE ENCOUNTER
PT CALLED AND WAS SEEN FOR H&P ON 01/19/20, WAS JUST ADV NEEDS TO HAVE EKG.  FAXED WAS RECEIVED , PLEASE PLACE ORDER    Brijesh 71

## 2021-01-23 ENCOUNTER — NURSE ONLY (OUTPATIENT)
Dept: FAMILY MEDICINE CLINIC | Facility: CLINIC | Age: 51
End: 2021-01-23
Payer: COMMERCIAL

## 2021-01-23 NOTE — PROGRESS NOTES
Pt was in office for EKG needed for upcoming MRI    Per notes in Epic 1/20/21  Fax to Pre Admission testing  please fax to pre admission [phone]      RN faxed and left copy at provider work station for review    Pt tolerated and was sent home in stabl

## 2021-01-26 ENCOUNTER — MED REC SCAN ONLY (OUTPATIENT)
Dept: FAMILY MEDICINE CLINIC | Facility: CLINIC | Age: 51
End: 2021-01-26

## 2021-01-26 ENCOUNTER — LAB ENCOUNTER (OUTPATIENT)
Dept: LAB | Age: 51
End: 2021-01-26
Attending: FAMILY MEDICINE
Payer: COMMERCIAL

## 2021-01-26 DIAGNOSIS — G89.29 CHRONIC MIDLINE LOW BACK PAIN WITH BILATERAL SCIATICA: ICD-10-CM

## 2021-01-26 DIAGNOSIS — M54.41 CHRONIC MIDLINE LOW BACK PAIN WITH BILATERAL SCIATICA: ICD-10-CM

## 2021-01-26 DIAGNOSIS — M54.42 CHRONIC MIDLINE LOW BACK PAIN WITH BILATERAL SCIATICA: ICD-10-CM

## 2021-01-26 DIAGNOSIS — Z11.59 ENCOUNTER FOR SCREENING FOR OTHER VIRAL DISEASES: ICD-10-CM

## 2021-01-26 DIAGNOSIS — Z01.818 PREPROCEDURAL EXAMINATION: ICD-10-CM

## 2021-01-27 LAB — SARS-COV-2 RNA RESP QL NAA+PROBE: NOT DETECTED

## 2021-01-28 ENCOUNTER — ANESTHESIA EVENT (OUTPATIENT)
Dept: MRI IMAGING | Facility: HOSPITAL | Age: 51
End: 2021-01-28
Payer: COMMERCIAL

## 2021-01-29 ENCOUNTER — HOSPITAL ENCOUNTER (OUTPATIENT)
Dept: MRI IMAGING | Facility: HOSPITAL | Age: 51
Discharge: HOME | End: 2021-01-29
Attending: FAMILY MEDICINE
Payer: COMMERCIAL

## 2021-01-29 ENCOUNTER — ANESTHESIA (OUTPATIENT)
Dept: MRI IMAGING | Facility: HOSPITAL | Age: 51
End: 2021-01-29
Payer: COMMERCIAL

## 2021-01-29 VITALS
DIASTOLIC BLOOD PRESSURE: 69 MMHG | HEART RATE: 99 BPM | OXYGEN SATURATION: 92 % | SYSTOLIC BLOOD PRESSURE: 127 MMHG | WEIGHT: 293 LBS | TEMPERATURE: 98 F | RESPIRATION RATE: 23 BRPM | BODY MASS INDEX: 50.02 KG/M2 | HEIGHT: 64 IN

## 2021-01-29 DIAGNOSIS — G89.29 CHRONIC MIDLINE LOW BACK PAIN WITH BILATERAL SCIATICA: ICD-10-CM

## 2021-01-29 DIAGNOSIS — M54.41 CHRONIC MIDLINE LOW BACK PAIN WITH BILATERAL SCIATICA: ICD-10-CM

## 2021-01-29 DIAGNOSIS — M54.42 CHRONIC MIDLINE LOW BACK PAIN WITH BILATERAL SCIATICA: ICD-10-CM

## 2021-01-29 PROCEDURE — 72148 MRI LUMBAR SPINE W/O DYE: CPT | Performed by: FAMILY MEDICINE

## 2021-01-29 RX ORDER — MIDAZOLAM HYDROCHLORIDE 1 MG/ML
INJECTION INTRAMUSCULAR; INTRAVENOUS AS NEEDED
Status: DISCONTINUED | OUTPATIENT
Start: 2021-01-29 | End: 2021-01-29 | Stop reason: SURG

## 2021-01-29 RX ORDER — MIDAZOLAM HYDROCHLORIDE 1 MG/ML
1 INJECTION INTRAMUSCULAR; INTRAVENOUS EVERY 5 MIN PRN
Status: DISCONTINUED | OUTPATIENT
Start: 2021-01-29 | End: 2021-01-30

## 2021-01-29 RX ORDER — ONDANSETRON 2 MG/ML
INJECTION INTRAMUSCULAR; INTRAVENOUS AS NEEDED
Status: DISCONTINUED | OUTPATIENT
Start: 2021-01-29 | End: 2021-01-29 | Stop reason: SURG

## 2021-01-29 RX ORDER — HYDROCODONE BITARTRATE AND ACETAMINOPHEN 5; 325 MG/1; MG/1
1 TABLET ORAL AS NEEDED
Status: DISCONTINUED | OUTPATIENT
Start: 2021-01-29 | End: 2021-01-31

## 2021-01-29 RX ORDER — ONDANSETRON 2 MG/ML
4 INJECTION INTRAMUSCULAR; INTRAVENOUS AS NEEDED
Status: DISCONTINUED | OUTPATIENT
Start: 2021-01-29 | End: 2021-01-30

## 2021-01-29 RX ORDER — NALOXONE HYDROCHLORIDE 0.4 MG/ML
80 INJECTION, SOLUTION INTRAMUSCULAR; INTRAVENOUS; SUBCUTANEOUS AS NEEDED
Status: DISCONTINUED | OUTPATIENT
Start: 2021-01-29 | End: 2021-01-30

## 2021-01-29 RX ORDER — HYDROMORPHONE HYDROCHLORIDE 1 MG/ML
0.4 INJECTION, SOLUTION INTRAMUSCULAR; INTRAVENOUS; SUBCUTANEOUS EVERY 5 MIN PRN
Status: DISCONTINUED | OUTPATIENT
Start: 2021-01-29 | End: 2021-01-30

## 2021-01-29 RX ORDER — SODIUM CHLORIDE 9 MG/ML
INJECTION, SOLUTION INTRAVENOUS CONTINUOUS PRN
Status: DISCONTINUED | OUTPATIENT
Start: 2021-01-29 | End: 2021-01-29 | Stop reason: SURG

## 2021-01-29 RX ORDER — DIPHENHYDRAMINE HYDROCHLORIDE 50 MG/ML
12.5 INJECTION INTRAMUSCULAR; INTRAVENOUS AS NEEDED
Status: DISCONTINUED | OUTPATIENT
Start: 2021-01-29 | End: 2021-01-30

## 2021-01-29 RX ORDER — SODIUM CHLORIDE, SODIUM LACTATE, POTASSIUM CHLORIDE, CALCIUM CHLORIDE 600; 310; 30; 20 MG/100ML; MG/100ML; MG/100ML; MG/100ML
INJECTION, SOLUTION INTRAVENOUS CONTINUOUS
Status: DISCONTINUED | OUTPATIENT
Start: 2021-01-29 | End: 2021-01-31

## 2021-01-29 RX ORDER — METOCLOPRAMIDE HYDROCHLORIDE 5 MG/ML
10 INJECTION INTRAMUSCULAR; INTRAVENOUS AS NEEDED
Status: DISCONTINUED | OUTPATIENT
Start: 2021-01-29 | End: 2021-01-30

## 2021-01-29 RX ORDER — MEPERIDINE HYDROCHLORIDE 25 MG/ML
12.5 INJECTION INTRAMUSCULAR; INTRAVENOUS; SUBCUTANEOUS AS NEEDED
Status: DISCONTINUED | OUTPATIENT
Start: 2021-01-29 | End: 2021-01-31

## 2021-01-29 RX ORDER — HYDROCODONE BITARTRATE AND ACETAMINOPHEN 5; 325 MG/1; MG/1
2 TABLET ORAL AS NEEDED
Status: DISCONTINUED | OUTPATIENT
Start: 2021-01-29 | End: 2021-01-31

## 2021-01-29 RX ORDER — LIDOCAINE HYDROCHLORIDE 10 MG/ML
INJECTION, SOLUTION EPIDURAL; INFILTRATION; INTRACAUDAL; PERINEURAL AS NEEDED
Status: DISCONTINUED | OUTPATIENT
Start: 2021-01-29 | End: 2021-01-29 | Stop reason: SURG

## 2021-01-29 RX ORDER — DEXAMETHASONE SODIUM PHOSPHATE 4 MG/ML
VIAL (ML) INJECTION AS NEEDED
Status: DISCONTINUED | OUTPATIENT
Start: 2021-01-29 | End: 2021-01-29 | Stop reason: SURG

## 2021-01-29 RX ADMIN — DEXAMETHASONE SODIUM PHOSPHATE 4 MG: 4 MG/ML VIAL (ML) INJECTION at 14:18:00

## 2021-01-29 RX ADMIN — LIDOCAINE HYDROCHLORIDE 50 MG: 10 INJECTION, SOLUTION EPIDURAL; INFILTRATION; INTRACAUDAL; PERINEURAL at 14:09:00

## 2021-01-29 RX ADMIN — MIDAZOLAM HYDROCHLORIDE 2 MG: 1 INJECTION INTRAMUSCULAR; INTRAVENOUS at 14:09:00

## 2021-01-29 RX ADMIN — SODIUM CHLORIDE: 9 INJECTION, SOLUTION INTRAVENOUS at 15:00:00

## 2021-01-29 RX ADMIN — SODIUM CHLORIDE: 9 INJECTION, SOLUTION INTRAVENOUS at 14:18:00

## 2021-01-29 RX ADMIN — ONDANSETRON 4 MG: 2 INJECTION INTRAMUSCULAR; INTRAVENOUS at 14:18:00

## 2021-01-29 NOTE — ANESTHESIA POSTPROCEDURE EVALUATION
[id number] Patient Status:  Outpatient   Age/Gender 53year old female MRN [id number]   Location [facility] MRI Attending Azeb Gilbert MD   Hosp Day # 0 PCP Elodia Ledesma MD       Anesthesia Post-op Note    * No pro

## 2021-01-29 NOTE — ANESTHESIA PROCEDURE NOTES
Airway  Date/Time: 1/29/2021 2:11 PM  Urgency: elective      General Information and Staff    Patient location during procedure: OR  Anesthesiologist: Louie Dakin, MD  Performed: anesthesiologist     Indications and Patient Condition  Indication

## 2021-01-29 NOTE — ANESTHESIA PREPROCEDURE EVALUATION
PRE-OP EVALUATION    Patient Name: Oseas Gilbert    Pre-op Diagnosis: chronic low back pain with sciatica    MRI lumbar spine no contrast    Pre-op vitals reviewed.   Temp: 96.8 °F (36 °C)  Pulse: 82  Resp: 17  BP: 124/65  SpO2: 96 %  Body mass

## 2021-02-04 ENCOUNTER — TELEPHONE (OUTPATIENT)
Dept: FAMILY MEDICINE CLINIC | Facility: CLINIC | Age: 51
End: 2021-02-04

## 2021-02-04 DIAGNOSIS — M79.604 PAIN IN BOTH LOWER EXTREMITIES: ICD-10-CM

## 2021-02-04 DIAGNOSIS — M79.605 PAIN IN BOTH LOWER EXTREMITIES: ICD-10-CM

## 2021-02-04 DIAGNOSIS — M54.41 CHRONIC MIDLINE LOW BACK PAIN WITH BILATERAL SCIATICA: Primary | ICD-10-CM

## 2021-02-04 DIAGNOSIS — M54.42 CHRONIC MIDLINE LOW BACK PAIN WITH BILATERAL SCIATICA: Primary | ICD-10-CM

## 2021-02-04 DIAGNOSIS — G89.29 CHRONIC MIDLINE LOW BACK PAIN WITH BILATERAL SCIATICA: Primary | ICD-10-CM

## 2021-02-04 NOTE — TELEPHONE ENCOUNTER
Pt does not sure her mychart, She would like to know if  The nurse can call her back with her MRI results.    Please return call to [phone]

## 2021-02-04 NOTE — TELEPHONE ENCOUNTER
Patient states she does not log into [employer] so did not see [address] result comments. Patient notified of results and verbalized understanding. States she would like to do PT at THE [facility] in [place].  Number to schedule provided    Patient aware [address] out of off

## 2021-02-04 NOTE — TELEPHONE ENCOUNTER
Left message for patient to call office.     From result note on MRI done 1/29/21:    Written by Slime Bar MD on 1/30/2021  6:53 PM  Good news in that the back looks great overall.  There's just a mildly bulging disc with mild arthritis in the lower ba

## 2021-02-22 ENCOUNTER — APPOINTMENT (OUTPATIENT)
Dept: PHYSICAL THERAPY | Age: 51
End: 2021-02-22
Attending: FAMILY MEDICINE
Payer: COMMERCIAL

## 2021-02-23 ENCOUNTER — TELEPHONE (OUTPATIENT)
Dept: FAMILY MEDICINE CLINIC | Facility: CLINIC | Age: 51
End: 2021-02-23

## 2021-02-23 RX ORDER — ROPINIROLE 0.25 MG/1
TABLET, FILM COATED ORAL
Qty: 11 TABLET | Refills: 0 | Status: SHIPPED | OUTPATIENT
Start: 2021-02-23 | End: 2021-04-03

## 2021-02-23 NOTE — TELEPHONE ENCOUNTER
ropinerole can cause muscle cramps or discomfort in about 3% of people, so it's possible it's contributing, especially if the timing fits from when she started it. If she's like to try without certainly fine by me.   I'll send script for 0.25mg pill, jeffery

## 2021-02-23 NOTE — TELEPHONE ENCOUNTER
Pt would like to talk to nurse about her back pain and leg pain. She went to a massage therapist and was told that her legs are very tight, She is said she had an MRI done and would like to know if you can see muscle on the MRI.  She thinks its caused by th

## 2021-02-23 NOTE — TELEPHONE ENCOUNTER
Had an hour massage with chiropractor, therapist told her that her legs are \"ridiculously tight\". Pharmacist told her it could be from the restless leg medicine. Would like to try being off the med, can you also order therapy to her upper legs.

## 2021-02-24 ENCOUNTER — TELEPHONE (OUTPATIENT)
Dept: PHYSICAL THERAPY | Facility: HOSPITAL | Age: 51
End: 2021-02-24

## 2021-03-02 ENCOUNTER — APPOINTMENT (OUTPATIENT)
Dept: PHYSICAL THERAPY | Age: 51
End: 2021-03-02
Attending: FAMILY MEDICINE
Payer: COMMERCIAL

## 2021-03-09 ENCOUNTER — OFFICE VISIT (OUTPATIENT)
Dept: PHYSICAL THERAPY | Age: 51
End: 2021-03-09
Attending: FAMILY MEDICINE
Payer: COMMERCIAL

## 2021-03-09 ENCOUNTER — TELEPHONE (OUTPATIENT)
Dept: PHYSICAL THERAPY | Facility: HOSPITAL | Age: 51
End: 2021-03-09

## 2021-03-09 PROCEDURE — 97112 NEUROMUSCULAR REEDUCATION: CPT

## 2021-03-09 PROCEDURE — 97162 PT EVAL MOD COMPLEX 30 MIN: CPT

## 2021-03-10 NOTE — PROGRESS NOTES
SPINE EVALUATION:   Referring Physician: Dr. Richelle Kebede  Diagnosis:  Low back and leg pain     Date of Service: 3/9/2021     PATIENT Neyda Richards is a 53year old female who presents to therapy today with complaints of low back pain and and Response:   Pt education was provided on exam findings, treatment diagnosis, treatment plan, expectations, and prognosis.  Pt was also provided recommendations for postural corrections and ergonomics     Repeated extension in lyin sets done: after 1 Potential:good    FOTO: 50/100    Patient/Family/Caregiver was advised of these findings, precautions, and treatment options and has agreed to actively participate in planning and for this course of care.     Thank you for your referral. Please co-sign or s

## 2021-03-11 ENCOUNTER — OFFICE VISIT (OUTPATIENT)
Dept: PHYSICAL THERAPY | Age: 51
End: 2021-03-11
Attending: FAMILY MEDICINE
Payer: COMMERCIAL

## 2021-03-11 PROCEDURE — 97140 MANUAL THERAPY 1/> REGIONS: CPT

## 2021-03-11 PROCEDURE — 97112 NEUROMUSCULAR REEDUCATION: CPT

## 2021-03-12 NOTE — PROGRESS NOTES
Dx: Low back and leg pain         Insurance (Authorized # of Visits):  N/A           Authorizing Physician: Dr. Cathern Hatchet  Next MD visit: none scheduled  Fall Risk: standard         Precautions: n/a             Subjective:  Maybe pain more centralized, feels loo pain)  Flexion/rotation knees L: better, 2 sets done                     HEP: flexion/rotation knees to the L, in sideling Hold 3 minutes every hour  Charges: 1 manual therapy, 2 neuro re-education       Total Timed Treatment: 45 min  Total Treatment Time:

## 2021-03-16 ENCOUNTER — OFFICE VISIT (OUTPATIENT)
Dept: PHYSICAL THERAPY | Age: 51
End: 2021-03-16
Attending: FAMILY MEDICINE
Payer: COMMERCIAL

## 2021-03-16 PROCEDURE — 97140 MANUAL THERAPY 1/> REGIONS: CPT

## 2021-03-16 PROCEDURE — 97112 NEUROMUSCULAR REEDUCATION: CPT

## 2021-03-16 NOTE — PROGRESS NOTES
Dx: Low back and leg pain         Insurance (Authorized # of Visits):  N/A           Authorizing Physician: Dr. Shea Drake MD visit: none scheduled  Fall Risk: standard         Precautions: n/a             Subjective: Did flexion/rotation knees to the R.  ( care education, educated and on proper form on lifting and bending to use legs and side steps and knees between toes Flexion/rotation knees to the R for 3 minutes, then repeated extension in lying: produce, no worse during.  After: side glide feels a little

## 2021-03-18 ENCOUNTER — OFFICE VISIT (OUTPATIENT)
Dept: PHYSICAL THERAPY | Age: 51
End: 2021-03-18
Attending: FAMILY MEDICINE
Payer: COMMERCIAL

## 2021-03-18 PROCEDURE — 97140 MANUAL THERAPY 1/> REGIONS: CPT

## 2021-03-18 PROCEDURE — 97112 NEUROMUSCULAR REEDUCATION: CPT

## 2021-03-18 NOTE — PROGRESS NOTES
Dx: Low back and leg pain         Insurance (Authorized # of Visits):  N/A           Authorizing Physician: Dr. Carlee Anderson  Next MD visit: none scheduled  Fall Risk: standard         Precautions: n/a             Subjective: Maybe a little looser, no change.  Stil and on proper form on lifting and bending to use legs and side steps and knees between toes Flexion/rotation knees to the R for 3 minutes, then repeated extension in lying: produce, no worse during.  After: side glide feels a little better and pain with ext your side (either side)  B. Sit in firm chairs, with rolled towel in your small back. Justin Michael in lap when you are on your phone  D.  Dishes, vacuuming, stairs  a. buttock back, head up (as much as possible)  Charges: 1 manual therapy, 2 neuro re-educati

## 2021-03-23 ENCOUNTER — APPOINTMENT (OUTPATIENT)
Dept: PHYSICAL THERAPY | Age: 51
End: 2021-03-23
Attending: FAMILY MEDICINE
Payer: COMMERCIAL

## 2021-03-25 ENCOUNTER — APPOINTMENT (OUTPATIENT)
Dept: PHYSICAL THERAPY | Age: 51
End: 2021-03-25
Attending: FAMILY MEDICINE
Payer: COMMERCIAL

## 2021-03-30 ENCOUNTER — OFFICE VISIT (OUTPATIENT)
Dept: PHYSICAL THERAPY | Age: 51
End: 2021-03-30
Attending: FAMILY MEDICINE
Payer: COMMERCIAL

## 2021-03-30 PROCEDURE — 97112 NEUROMUSCULAR REEDUCATION: CPT

## 2021-03-30 PROCEDURE — 97530 THERAPEUTIC ACTIVITIES: CPT

## 2021-03-30 NOTE — PROGRESS NOTES
Progress Summary  Pt has attended 5 visits in Physical Therapy.      Dx: Low back and leg pain         Insurance (Authorized # of Visits):  N/A           Authorizing Physician: Dr. Josef Rodriguez  Next MD visit: none scheduled  Fall Risk: standard         Precaution 44/100    Plan: Continue skilled Physical Therapy 2 x/week or a total of 16 visits over a 90 day period.  Treatment will include: manual therapy, therapeutic exercise, therapeutic activites, neuromuscular reeducation and home program       Patient/Family/Ca produce, no worse. 3rd set with flexion/rotation knees to the L therapist forces and manual technique to opposite side and repeated extension in lying x15: produce no worse.  After maybe a little better  Posture education told sleep with sheet on side  Edu therapist forces in supine: shift abolished.   Taught new form of flexion/rotation knees to the L with supine bias (see below)          Manual therapy: 15 minutes:   Flexion/rotation knees to the R: worse (now more pain in side glides and more distal pain)

## 2021-04-01 ENCOUNTER — OFFICE VISIT (OUTPATIENT)
Dept: PHYSICAL THERAPY | Age: 51
End: 2021-04-01
Attending: FAMILY MEDICINE
Payer: COMMERCIAL

## 2021-04-01 PROCEDURE — 97112 NEUROMUSCULAR REEDUCATION: CPT

## 2021-04-01 PROCEDURE — 97140 MANUAL THERAPY 1/> REGIONS: CPT

## 2021-04-01 NOTE — PROGRESS NOTES
Dx: Low back and leg pain         Insurance (Authorized # of Visits):  N/A           Authorizing Physician: Dr. Amanda Dyson  Next MD visit: none scheduled  Fall Risk: standard         Precautions: n/a             Subjective: Maybe a little better, did HEP.  Still better.   Posture education, pathology education, plan of care education, educated and on proper form on lifting and bending to use legs and side steps and knees between toes Flexion/rotation knees to the R for 3 minutes, then repeated extension in lying: p repeated x15: After better R side glide and better 5/5 great toe myotome. Repeated extension in [id number]: produce, worse (wider area of back as she repeats this movement).  After flexion ROM is less painful  Repeated flexion in standing x15 reps: produc stairs  a. buttock back, head up (as much as possible)  Charges: 2 manual therapy, 1 neuro re-education       Total Timed Treatment: 40 min  Total Treatment Time: 40 min

## 2021-04-03 ENCOUNTER — OFFICE VISIT (OUTPATIENT)
Dept: FAMILY MEDICINE CLINIC | Facility: CLINIC | Age: 51
End: 2021-04-03
Payer: COMMERCIAL

## 2021-04-03 ENCOUNTER — LAB ENCOUNTER (OUTPATIENT)
Dept: LAB | Age: 51
End: 2021-04-03
Attending: FAMILY MEDICINE
Payer: COMMERCIAL

## 2021-04-03 VITALS
RESPIRATION RATE: 18 BRPM | TEMPERATURE: 97 F | OXYGEN SATURATION: 98 % | HEART RATE: 82 BPM | HEIGHT: 64 IN | SYSTOLIC BLOOD PRESSURE: 122 MMHG | BODY MASS INDEX: 50.02 KG/M2 | WEIGHT: 293 LBS | DIASTOLIC BLOOD PRESSURE: 80 MMHG

## 2021-04-03 DIAGNOSIS — E53.8 LOW SERUM VITAMIN B12: ICD-10-CM

## 2021-04-03 DIAGNOSIS — G47.33 OSA (OBSTRUCTIVE SLEEP APNEA): Primary | ICD-10-CM

## 2021-04-03 DIAGNOSIS — E55.9 VITAMIN D DEFICIENCY: ICD-10-CM

## 2021-04-03 DIAGNOSIS — G47.61 PLMD (PERIODIC LIMB MOVEMENT DISORDER): ICD-10-CM

## 2021-04-03 PROCEDURE — 3079F DIAST BP 80-89 MM HG: CPT | Performed by: FAMILY MEDICINE

## 2021-04-03 PROCEDURE — 99214 OFFICE O/P EST MOD 30 MIN: CPT | Performed by: FAMILY MEDICINE

## 2021-04-03 PROCEDURE — 82728 ASSAY OF FERRITIN: CPT | Performed by: FAMILY MEDICINE

## 2021-04-03 PROCEDURE — 83540 ASSAY OF IRON: CPT | Performed by: FAMILY MEDICINE

## 2021-04-03 PROCEDURE — 3074F SYST BP LT 130 MM HG: CPT | Performed by: FAMILY MEDICINE

## 2021-04-03 PROCEDURE — 36415 COLL VENOUS BLD VENIPUNCTURE: CPT | Performed by: FAMILY MEDICINE

## 2021-04-03 PROCEDURE — 83550 IRON BINDING TEST: CPT | Performed by: FAMILY MEDICINE

## 2021-04-03 PROCEDURE — 82306 VITAMIN D 25 HYDROXY: CPT | Performed by: FAMILY MEDICINE

## 2021-04-03 PROCEDURE — 3008F BODY MASS INDEX DOCD: CPT | Performed by: FAMILY MEDICINE

## 2021-04-03 RX ORDER — ROPINIROLE 0.25 MG/1
TABLET, FILM COATED ORAL
Qty: 11 TABLET | Refills: 0 | Status: SHIPPED | OUTPATIENT
Start: 2021-04-03 | End: 2022-01-26

## 2021-04-03 NOTE — PROGRESS NOTES
[facility]        HPI:   This is a 53year old female coming in for Patient presents with:  Obstructive Sleep Apnea (PHYLLIS)      HPI: she currently needs new supplies and hasn't been changing her hoses masks filters or H2O) - -   Sleep EFFC (%) - -   TOT Sleep TM (min) - -   Do you snore loudly (loud enough to be heard through closed doors)? - -   Do you often feel tired, fatigued, or sleepy during the day? - -   Has anyone observed you stop breathing during your sleep? Allergies:    Penicillins             OTHER (SEE COMMENTS)    Comment:As a child.  Unsure of reaction  Current Meds:  Current Outpatient Medications   Medication Sig Dispense Refill   • rOPINIRole HCl 0.25 MG Oral Tab 1 po at bedtime x 1 week, then 1 po Wt 299 lb (135.6 kg)   LMP 08/01/2020   SpO2 98%   BMI 51.32 kg/m²  Estimated body mass index is 51.32 kg/m² as calculated from the following:    Height as of this encounter: 5' 4\" (1.626 m). Weight as of this encounter: 299 lb (135.6 kg).    Neck in Judgment normal.         ASSESSMENT AND PLAN:   1. PHYLLIS (obstructive sleep apnea)    recommend mask change to f30 I. Discussed changing and cleaning. Start flonase at night and saline in am   Restart requip.    Call Lizzy Lopez [phone] to update mask Prescriptions Disp Refills   • rOPINIRole HCl 0.25 MG Oral Tab 11 tablet 0     Si po at bedtime x 1 week, then 1 po qonight x 4 doses then stop       Outcome: Parent verbalizes understanding.  Parent is notified to call with any questions, complications

## 2021-04-05 ENCOUNTER — TELEPHONE (OUTPATIENT)
Dept: FAMILY MEDICINE CLINIC | Facility: CLINIC | Age: 51
End: 2021-04-05

## 2021-04-05 DIAGNOSIS — R79.0 LOW IRON STORES: Primary | ICD-10-CM

## 2021-04-05 NOTE — TELEPHONE ENCOUNTER
----- Message from Gladis Osorio MD sent at 4/5/2021  8:52 AM CDT -----  If PLMD is an issue,   Increase iron x 3 months and recheck iron profile. Increase vitamin D supplementation.

## 2021-04-06 NOTE — TELEPHONE ENCOUNTER
Patient notified and verbalizes understanding. She does want to increase her iron and vitamin d. Discussed iron plus c and vitamin d 2000 unit supplements. Future labs pended.

## 2021-04-07 ENCOUNTER — OFFICE VISIT (OUTPATIENT)
Dept: PHYSICAL THERAPY | Age: 51
End: 2021-04-07
Attending: FAMILY MEDICINE
Payer: COMMERCIAL

## 2021-04-07 PROCEDURE — 97140 MANUAL THERAPY 1/> REGIONS: CPT

## 2021-04-07 PROCEDURE — 97112 NEUROMUSCULAR REEDUCATION: CPT

## 2021-04-07 NOTE — PROGRESS NOTES
Dx: Low back and leg pain         Insurance (Authorized # of Visits):  N/A           Authorizing Physician: Dr. Augustine Rubin  Next MD visit: none scheduled  Fall Risk: standard         Precautions: n/a             Subjective:Better, could walk further with less pa 3/18/2021                TX#: 4/16 Date:     3/30/2021              TX#: 5/16 Date:  4/1/2021  Tx#: 6/16 Date: 4/7/2021  Tx#: 7/16      Neuro re-education 25 minutes  Repeated extension with hips off center to the L: produce, no worse.  After no effect  Rep description) (10 minutes Reviewed posture, plan of care, goals.   Reviewed posture, educated on correct form of wheel chair lifting and pushing for her Mom    Flexion/rotation knees to the L 3 minutes pt forces with therapist overpressure: produce R hip lakeisha to the R: worse (now more pain in side glides and more distal pain)  Flexion/rotation knees L: better, 2 sets done     Manual therapy 8 minutes:   Thoracic extension mobilization in L mid-rotation                            HEP:   Every 1 hour:  1.  Stand w

## 2021-04-09 ENCOUNTER — OFFICE VISIT (OUTPATIENT)
Dept: PHYSICAL THERAPY | Age: 51
End: 2021-04-09
Attending: FAMILY MEDICINE
Payer: COMMERCIAL

## 2021-04-09 PROCEDURE — 97110 THERAPEUTIC EXERCISES: CPT

## 2021-04-09 PROCEDURE — 97530 THERAPEUTIC ACTIVITIES: CPT

## 2021-04-09 PROCEDURE — 97112 NEUROMUSCULAR REEDUCATION: CPT

## 2021-04-09 NOTE — PROGRESS NOTES
Dx: Low back and leg pain         Insurance (Authorized # of Visits):  N/A           Authorizing Physician: Dr. Martinez Code  Next MD visit: none scheduled  Fall Risk: standard         Precautions: n/a             Subjective:Better, less sore in back.  Still some p 4/7/2021  Tx#: 7/16 4/9/2021  Tx#: 8/16     Thoracic extension in sitting mobilization: After worse: now L rotation hurts and extension in standing hurts  Posture correction: no effect  Prone lumbar extension/rotation mobilization R side increased R side p full range of left turning. From here, elbows together and raise chest to the ceiling. x15 times (have  help if possible)  Four times a day:  3. Sit and standing. Shoulder blades and head slide backwards. Then relax 15%. Repeat 10 times.  After both

## 2021-04-16 ENCOUNTER — OFFICE VISIT (OUTPATIENT)
Dept: PHYSICAL THERAPY | Age: 51
End: 2021-04-16
Attending: FAMILY MEDICINE
Payer: COMMERCIAL

## 2021-04-16 PROCEDURE — 97112 NEUROMUSCULAR REEDUCATION: CPT

## 2021-04-16 NOTE — PROGRESS NOTES
Progress Summary  Pt has attended 9 visits in Physical Therapy.    Dx: Low back and leg pain         Insurance (Authorized # of Visits):  N/A           Authorizing Physician: Dr. Carlee Anderson  Next MD visit: none scheduled  Fall Risk: standard         Precautions: (50% improved 4/16/2021)    New Goal:  Pt will report pain at its worst from 5/10 to 2/10 to allow for improved ability to walk, sit and perform housework   FOTO: 52/100    Plan: Continue skilled Physical Therapy 2 x/week or a total of 12 visits over a 90 done with therapist overpressure. Numerous cues for correct form and video made to instruct  on correct form of HEP as well. Thoracic in standing with L rotation with elbows on wall, cues for correct form 4 sets done: better after.  15-20 reps, walk as you try to hold it. Keep in your “bag of tools”, but avoid doing this if possible for the next 48 hours. 3. Stand with feet forward, and turn torso to left. Have elbows together and on wall with arms raised up rather high.  From here lean into

## 2021-04-20 ENCOUNTER — OFFICE VISIT (OUTPATIENT)
Dept: PHYSICAL THERAPY | Age: 51
End: 2021-04-20
Attending: FAMILY MEDICINE
Payer: COMMERCIAL

## 2021-04-20 PROCEDURE — 97140 MANUAL THERAPY 1/> REGIONS: CPT

## 2021-04-20 PROCEDURE — 97112 NEUROMUSCULAR REEDUCATION: CPT

## 2021-04-21 NOTE — PROGRESS NOTES
Dx: Low back and leg pain         Insurance (Authorized # of Visits):  N/A           Authorizing Physician: Dr. Leonila Gonsalez  Next MD visit: none scheduled  Fall Risk: standard         Precautions: n/a             Subjective: Feels 100% better than when she starte to the R. Posture education and core strengthen.     Date:  4/1/2021  Tx#: 6/16 Date: 4/7/2021  Tx#: 7/16 4/9/2021  Tx#: 8/16 4/15/2021  Tx# 9/16 4/20/2021  Tx #: 10/16        Thoracic extension in sitting mobilization: After worse: now L rotation hurts and baselines to confirm correct management throughout session  L hip extension in kneeing x10 reps, sustain last rep for 30 seconds, better. 2nd set with 5 reps and 60 seconds sustained: a little more better.  Third set after manual therapy: more better   Educ stairs  a. buttock back, head up (as much as possible)  Charges: 2 neuro re-education, 1 manual therapy      Total Timed Treatment: 38 min  Total Treatment Time: 38 min

## 2021-04-22 ENCOUNTER — TELEPHONE (OUTPATIENT)
Dept: PHYSICAL THERAPY | Facility: HOSPITAL | Age: 51
End: 2021-04-22

## 2021-04-23 ENCOUNTER — APPOINTMENT (OUTPATIENT)
Dept: PHYSICAL THERAPY | Age: 51
End: 2021-04-23
Attending: FAMILY MEDICINE
Payer: COMMERCIAL

## 2021-04-23 RX ORDER — FLUTICASONE PROPIONATE 50 MCG
2 SPRAY, SUSPENSION (ML) NASAL AS NEEDED
Qty: 1 BOTTLE | Refills: 0 | Status: SHIPPED | OUTPATIENT
Start: 2021-04-23 | End: 2021-05-27

## 2021-04-23 NOTE — TELEPHONE ENCOUNTER
LOV 1/19/2021  Last labs 4/3/2021  Last refill on 1/19/2021, for #1 bottle, with 3 refills  Fluticasone Propionate 50 MCG/ACT Nasal Suspension    Future Appointments   Date Time Provider Antonia Keene   4/27/2021  4:00 PM Willie Blanchard, [address]

## 2021-04-27 ENCOUNTER — OFFICE VISIT (OUTPATIENT)
Dept: PHYSICAL THERAPY | Age: 51
End: 2021-04-27
Attending: FAMILY MEDICINE
Payer: COMMERCIAL

## 2021-04-27 PROCEDURE — 97140 MANUAL THERAPY 1/> REGIONS: CPT

## 2021-04-27 PROCEDURE — 97110 THERAPEUTIC EXERCISES: CPT

## 2021-04-27 NOTE — PROGRESS NOTES
Dx: Low back and leg pain         Insurance (Authorized # of Visits):  16 visits via [address], [address]  Physician: Dr. Gael Griffiths  Next MD visit: none scheduled  Fall Risk: standard         Precautions: n/a             Subjective: Better, can walk furth assess hip strength and core strength. Future, if needed for mechanical loading. Re-explore if anything in the genre thoracic spine L rotation and also extension would be helpful. When ready progress to extension forces.  If exhausted consider Flexion/rot for comfort)  Four times a day:  2. Sit and standing. Shoulder blades and head slide backwards. Then relax 15%. Repeat 10 times. After both of these, hold them for at least 30 seconds.  With exercise in standing, as you hold it for 30 seconds, walk as you t

## 2021-04-29 ENCOUNTER — APPOINTMENT (OUTPATIENT)
Dept: PHYSICAL THERAPY | Age: 51
End: 2021-04-29
Attending: FAMILY MEDICINE
Payer: COMMERCIAL

## 2021-05-04 ENCOUNTER — OFFICE VISIT (OUTPATIENT)
Dept: PHYSICAL THERAPY | Age: 51
End: 2021-05-04
Attending: FAMILY MEDICINE
Payer: COMMERCIAL

## 2021-05-04 PROCEDURE — 97110 THERAPEUTIC EXERCISES: CPT

## 2021-05-04 NOTE — PROGRESS NOTES
Dx: Low back and leg pain         Insurance (Authorized # of Visits):  16 visits via [address], [address]  Physician: Dr. Lazaro Sanchez  Next MD visit: none scheduled  Fall Risk: standard         Precautions: n/a             Subjective: About the same as last core strength. Future, if needed for mechanical loading. Re-explore if anything in the genre thoracic spine L rotation and also extension would be helpful. When ready progress to extension forces. If exhausted consider Flexion/rotation knees to the R.  Po program, see below.    With there ex today, all done with cues of verbal, tactile and often visual.             Manual therapy 8 minutes:   L hip extension mobilization/overpressure in R sidelying Manual therapy 8 minutes:   L hip extension mobilization/ove

## 2021-05-06 ENCOUNTER — APPOINTMENT (OUTPATIENT)
Dept: PHYSICAL THERAPY | Age: 51
End: 2021-05-06
Attending: FAMILY MEDICINE
Payer: COMMERCIAL

## 2021-05-12 ENCOUNTER — TELEPHONE (OUTPATIENT)
Dept: PHYSICAL THERAPY | Facility: HOSPITAL | Age: 51
End: 2021-05-12

## 2021-05-13 ENCOUNTER — APPOINTMENT (OUTPATIENT)
Dept: PHYSICAL THERAPY | Age: 51
End: 2021-05-13
Attending: FAMILY MEDICINE
Payer: COMMERCIAL

## 2021-05-19 ENCOUNTER — TELEPHONE (OUTPATIENT)
Dept: FAMILY MEDICINE CLINIC | Facility: CLINIC | Age: 51
End: 2021-05-19

## 2021-05-19 ENCOUNTER — TELEMEDICINE (OUTPATIENT)
Dept: FAMILY MEDICINE CLINIC | Facility: CLINIC | Age: 51
End: 2021-05-19
Payer: COMMERCIAL

## 2021-05-19 DIAGNOSIS — L23.7 POISON IVY DERMATITIS: Primary | ICD-10-CM

## 2021-05-19 PROCEDURE — 99213 OFFICE O/P EST LOW 20 MIN: CPT | Performed by: FAMILY MEDICINE

## 2021-05-19 RX ORDER — PREDNISONE 20 MG/1
TABLET ORAL
Qty: 21 TABLET | Refills: 0 | Status: SHIPPED | OUTPATIENT
Start: 2021-05-19 | End: 2021-09-20 | Stop reason: ALTCHOICE

## 2021-05-19 NOTE — PROGRESS NOTES
Annalee Robertson is a 53year old female.   HPI:   Virtual Video/Telephone Check-In    Annalee Robertson verbally consents to a [facility] video visit on 5/19/21    Patient understands and accepts financial responsibility for any deductible, co rOPINIRole HCl 0.25 MG Oral Tab 1 po at bedtime x 1 week, then 1 po qonight x 4 doses then stop 11 tablet 0   • traZODone HCl 100 MG Oral Tab Take 1 tablet (100 mg total) by mouth nightly.  90 tablet 3   • Biotin 33694 MCG Oral Tab Take 1 tablet by mouth da obvious mass  LUNGS: normal resp effort, no conversational dyspnea, no cough  CARDIO: appears well perfused  EXTREMITIES: moves all extremities  PSYCH: good affect, interactive, A&Ox2    ASSESSMENT AND PLAN:   Diagnoses and all orders for this visit:    Po

## 2021-05-20 ENCOUNTER — OFFICE VISIT (OUTPATIENT)
Dept: PHYSICAL THERAPY | Age: 51
End: 2021-05-20
Attending: FAMILY MEDICINE
Payer: COMMERCIAL

## 2021-05-20 PROCEDURE — 97110 THERAPEUTIC EXERCISES: CPT

## 2021-05-20 NOTE — PROGRESS NOTES
Dx: Low back and leg pain         Insurance (Authorized # of Visits):  16 visits via [address] [facility], [address]  Physician: Dr. Cathern Hatchet  Next MD visit: none scheduled  Fall Risk: standard         Precautions: n/a             Subjective: A little sore in mid-l side glide opposite of how she shifts hips with L single leg stance affects L single leg stance position. Progress, assess hip strength and core strength. Future, if needed for mechanical loading.  Re-explore if anything in the genre thoracic spine L rot sets and 5 reps R LE  Standing on R LE with hands on wall 10-15 seconds, 3 sets  Posture education to use lumbar support in sitting. Educated on new home program, see below.    With there ex today, all done with cues of verbal, tactile and often visual. and breath deep from your diaphragm. 1 minute, 3 sets  6. Stand on R leg hold and on left leg with right foot down on ground 25 seconds, 3 sets   7. Stand on left leg with right ball of foot down on ground 25 seconds, 3 sets   Reminders:   A.  Car with back

## 2021-05-25 ENCOUNTER — TELEPHONE (OUTPATIENT)
Dept: PHYSICAL THERAPY | Facility: HOSPITAL | Age: 51
End: 2021-05-25

## 2021-05-27 ENCOUNTER — APPOINTMENT (OUTPATIENT)
Dept: PHYSICAL THERAPY | Age: 51
End: 2021-05-27
Attending: FAMILY MEDICINE
Payer: COMMERCIAL

## 2021-05-27 RX ORDER — FLUTICASONE PROPIONATE 50 MCG
SPRAY, SUSPENSION (ML) NASAL
Qty: 16 ML | Refills: 0 | Status: SHIPPED | OUTPATIENT
Start: 2021-05-27 | End: 2021-07-01

## 2021-05-27 NOTE — TELEPHONE ENCOUNTER
LOV 1/19/2021  Last labs 4/3/2021  Last refill on 4/23/2021, for #1 bottle, with 0 refills  Fluticasone Propionate 50 MCG/ACT Nasal Suspension    Future Appointments   Date Time Provider Antonia Keene   6/3/2021  5:30 PM CHELLY Danielson

## 2021-06-03 ENCOUNTER — OFFICE VISIT (OUTPATIENT)
Dept: PHYSICAL THERAPY | Age: 51
End: 2021-06-03
Attending: FAMILY MEDICINE
Payer: COMMERCIAL

## 2021-06-03 PROCEDURE — 97110 THERAPEUTIC EXERCISES: CPT

## 2021-06-03 NOTE — PROGRESS NOTES
Dx: Low back and leg pain         Insurance (Authorized # of Visits):  16 visits via [address], [address]  Physician: Dr. Rahel Juarez  Next MD visit: none scheduled  Fall Risk: standard         Precautions: n/a             Subjective: Better, can do more wi and core strength. Future, if needed for mechanical loading. Re-explore if anything in the genre thoracic spine L rotation and also extension would be helpful. When ready progress to extension forces.  If exhausted consider Flexion/rotation knees to the R support in sitting. Educated on new home program, see below.    With there ex today, all done with cues of verbal, tactile and often visual.       TE:  Lumbar extension x10: no effect  External oblqiue contract isometric 30\" x3, dynamic x20 reps (On R isabella these, hold them for at least 30 seconds. With exercise in standing, as you hold it for 30 seconds, walk as you try to hold it. Once a day (in this order):  2. Plank up on knees and forearms. 1 minute, 3 sets  3.  Lay on your side, slight bend in your in

## 2021-06-18 ENCOUNTER — OFFICE VISIT (OUTPATIENT)
Dept: PHYSICAL THERAPY | Age: 51
End: 2021-06-18
Attending: FAMILY MEDICINE
Payer: COMMERCIAL

## 2021-06-18 PROCEDURE — 97530 THERAPEUTIC ACTIVITIES: CPT

## 2021-06-18 PROCEDURE — 97110 THERAPEUTIC EXERCISES: CPT

## 2021-06-18 NOTE — PROGRESS NOTES
Dx: Low back and leg pain         Insurance (Authorized # of Visits):  16 visits via [address] [facility], [address]  Physician: Dr. Gene Hernandez  Next MD visit: none scheduled  Fall Risk: standard         Precautions: n/a             Subjective:     3-4 days ago pain at its worst from 8/10 to 5/10 to allow for improved ability to walk, sit and perform housework (Goal met 3/30/2021)  Pt will improve lumbar ROM to be min/nil loss of motion with 2/10 or less pain to improve ability to walk, sit or perform housework.  (50% has R toes/ball of foot on ground to increase stability and prevent lateral shifting)  Posture education to use lumbar support in sitting. Educated on new home program, see below.    With there ex today, all done with cues of verbal, tactile and often visu forearms. 1 minute, 3 sets  4. Lay on back, bend both knees. Raise buttock up 20 times, 3 sets   5. Lay on back, make back flat against the bed. Draw your abdomen up and under your rib cage and breath deep from your diaphragm.  As you hold this, raise one l

## 2021-06-24 ENCOUNTER — APPOINTMENT (OUTPATIENT)
Dept: PHYSICAL THERAPY | Age: 51
End: 2021-06-24
Attending: FAMILY MEDICINE
Payer: COMMERCIAL

## 2021-07-01 ENCOUNTER — OFFICE VISIT (OUTPATIENT)
Dept: PHYSICAL THERAPY | Age: 51
End: 2021-07-01
Attending: FAMILY MEDICINE
Payer: COMMERCIAL

## 2021-07-01 ENCOUNTER — APPOINTMENT (OUTPATIENT)
Dept: PHYSICAL THERAPY | Age: 51
End: 2021-07-01
Attending: FAMILY MEDICINE
Payer: COMMERCIAL

## 2021-07-01 PROCEDURE — 97110 THERAPEUTIC EXERCISES: CPT

## 2021-07-01 PROCEDURE — 97140 MANUAL THERAPY 1/> REGIONS: CPT

## 2021-07-01 RX ORDER — FLUTICASONE PROPIONATE 50 MCG
SPRAY, SUSPENSION (ML) NASAL
Qty: 16 ML | Refills: 0 | Status: SHIPPED | OUTPATIENT
Start: 2021-07-01 | End: 2021-08-02

## 2021-07-01 NOTE — PROGRESS NOTES
Progress Summary  Pt has attended 16 visits in Physical Therapy.        Dx: Low back and leg pain         Insurance (Authorized # of Visits):  16 visits via [address] [facility], [address]  Physician: Dr. Swetha Osuna  Next MD visit: none scheduled  Fall Risk: standard ability to walk, sit or perform housework.  (80% improved 7/1/2021)    New Goal:  Pt will report pain at its worst from 5/10 to 2/10 to allow for improved ability to walk, sit and perform housework (30% improved, reports 4/10 pain at its worst on 7/1/2021) extension in standing x10-15 reps, 2 sets. Better after 1st set.   Bridge single leg bias 10-15 reps, 2 sets  Plank on knees 1 minute, 2 sets  Standing hip abduction with L leg 10-15 reps, 2 sets and 5 reps R LE  Standing on R LE with hands on wall 10-15 se goals and plan of care. Educated on home program, see below. Verbal, visual and tactile cues for there ex. There act:  Practiced ideal form with lifting a wheelchair and helping her Mom up the stairs. Posture education on sitting.   Lumbar exten ball of foot down on ground 25 seconds, 3 sets     Reminders:   A. Car with back support  B. Sleep with long bath towel on your side (either side), try just half rolled towel.  It goes in soft spot above pelvis, where your lungs are.  C. Sit in firm chairs,

## 2021-07-01 NOTE — TELEPHONE ENCOUNTER
LOV Telemedicine 05/19/2021    Last refill on 05/27/2021, for #16 mL, with 0 refills  FLUTICASONE PROPIONATE 50 MCG/ACT Nasal Suspension    Future Appointments   Date Time Provider Antonia Keene   7/1/2021  4:00 PM Destin Cole, [address] [facility]

## 2021-07-08 ENCOUNTER — APPOINTMENT (OUTPATIENT)
Dept: PHYSICAL THERAPY | Age: 51
End: 2021-07-08
Attending: FAMILY MEDICINE
Payer: COMMERCIAL

## 2021-07-12 ENCOUNTER — APPOINTMENT (OUTPATIENT)
Dept: PHYSICAL THERAPY | Age: 51
End: 2021-07-12
Attending: FAMILY MEDICINE
Payer: COMMERCIAL

## 2021-07-14 ENCOUNTER — TELEPHONE (OUTPATIENT)
Dept: PHYSICAL THERAPY | Facility: HOSPITAL | Age: 51
End: 2021-07-14

## 2021-07-20 ENCOUNTER — APPOINTMENT (OUTPATIENT)
Dept: PHYSICAL THERAPY | Age: 51
End: 2021-07-20
Attending: FAMILY MEDICINE
Payer: COMMERCIAL

## 2021-08-02 DIAGNOSIS — R09.81 NASAL CONGESTION: Primary | ICD-10-CM

## 2021-08-02 RX ORDER — FLUTICASONE PROPIONATE 50 MCG
SPRAY, SUSPENSION (ML) NASAL
Qty: 16 ML | Refills: 0 | Status: SHIPPED | OUTPATIENT
Start: 2021-08-02 | End: 2021-08-27

## 2021-08-02 NOTE — TELEPHONE ENCOUNTER
Allergy Medication Protocol [id number]/02/2021 08:30 AM   Appointment in the past 12 or next 3 months     Refilled per refill protocol.

## 2021-08-17 ENCOUNTER — APPOINTMENT (OUTPATIENT)
Dept: PHYSICAL THERAPY | Age: 51
End: 2021-08-17
Attending: FAMILY MEDICINE
Payer: COMMERCIAL

## 2021-08-27 DIAGNOSIS — R09.81 NASAL CONGESTION: ICD-10-CM

## 2021-08-27 RX ORDER — FLUTICASONE PROPIONATE 50 MCG
SPRAY, SUSPENSION (ML) NASAL
Qty: 16 ML | Refills: 0 | Status: SHIPPED | OUTPATIENT
Start: 2021-08-27 | End: 2021-09-29

## 2021-08-27 NOTE — TELEPHONE ENCOUNTER
Allergy Medication Protocol [id number]/27/2021 09:30 AM   Appointment in the past 12 or next 3 months     FLUTICASONE PROPIONATE 50 MCG/ACT Nasal Suspension  Last refill 8/2/21 #16ml with 0 refills  Last OV-5/19/21 telemedicine  Last labs-4/3/21    Refilled

## 2021-09-02 ENCOUNTER — APPOINTMENT (OUTPATIENT)
Dept: PHYSICAL THERAPY | Age: 51
End: 2021-09-02
Attending: FAMILY MEDICINE
Payer: COMMERCIAL

## 2021-09-20 ENCOUNTER — OFFICE VISIT (OUTPATIENT)
Dept: FAMILY MEDICINE CLINIC | Facility: CLINIC | Age: 51
End: 2021-09-20
Payer: COMMERCIAL

## 2021-09-20 VITALS
TEMPERATURE: 97 F | HEART RATE: 78 BPM | BODY MASS INDEX: 50.02 KG/M2 | OXYGEN SATURATION: 98 % | HEIGHT: 64 IN | WEIGHT: 293 LBS | DIASTOLIC BLOOD PRESSURE: 82 MMHG | SYSTOLIC BLOOD PRESSURE: 132 MMHG | RESPIRATION RATE: 20 BRPM

## 2021-09-20 DIAGNOSIS — R41.840 ATTENTION DEFICIT: ICD-10-CM

## 2021-09-20 DIAGNOSIS — R60.0 PEDAL EDEMA: ICD-10-CM

## 2021-09-20 DIAGNOSIS — R53.83 FATIGUE, UNSPECIFIED TYPE: Primary | ICD-10-CM

## 2021-09-20 PROCEDURE — 3079F DIAST BP 80-89 MM HG: CPT | Performed by: FAMILY MEDICINE

## 2021-09-20 PROCEDURE — 99213 OFFICE O/P EST LOW 20 MIN: CPT | Performed by: FAMILY MEDICINE

## 2021-09-20 PROCEDURE — 3008F BODY MASS INDEX DOCD: CPT | Performed by: FAMILY MEDICINE

## 2021-09-20 PROCEDURE — 3075F SYST BP GE 130 - 139MM HG: CPT | Performed by: FAMILY MEDICINE

## 2021-09-20 NOTE — PATIENT INSTRUCTIONS
Provigil Oral Tablet 100 mg  Uses  This medicine is used for the following purposes:  · narcolepsy  · drowsiness due to sleep disorder  Instructions  This medicine may be taken with or without food.   It is very important that you take the medicine at a pharmacist if you ever had an allergic reaction to a medicine. Symptoms of an allergic reaction can include trouble breathing, skin rash, itching, swelling, or severe dizziness. Some patients taking this medicine have experienced serious side effects.  Ple about what you should do if you experience these or other side effects.   · agitated feeling or trouble sleeping  · dizziness  · headaches  · nausea  · nervousness  Call your doctor or get medical help right away if you notice any of these more serious side healthy men usually weigh more than healthy women of the same height. · Your current weight. If you are very heavy, focus on losing a smaller amount (such as 10 percent of your body weight). Losing just 5 to 10 pounds can improve your health.    Your body

## 2021-09-20 NOTE — PROGRESS NOTES
[address] [facility] Office Note  Chief Complaint:   Patient presents with:  Fatigue      HPI:   This is a 49year old female coming in with complaints of fatigue     Sleep apnea - has a CPAP machine   Mother in the hospital (hx of multiple Oral Tab Take 1 tablet by mouth daily. • Cyanocobalamin (VITAMIN B12 OR) Take 1 tablet by mouth daily. • IRON OR Take 65 mg by mouth daily. • Cholecalciferol (VITAMIN D3) 1000 units Oral Cap Take 4,000 Units by mouth daily.        • Multiple time  GAIT: Normal        ASSESSMENT AND PLAN:   1. Fatigue, unspecified type  - CARD ECHO 2D DOPPLER (CPT=93306); Future  - CBC WITH DIFFERENTIAL WITH PLATELET; Future  - COMP METABOLIC PANEL (14);  Future  - TSH W REFLEX TO FREE T4; Future  - CBC WITH DIF Adjustment disorder with depressed mood     Chronic insomnia     Low serum vitamin B12     Vitamin D deficiency     PLMD (periodic limb movement disorder)     PHYLLIS (obstructive sleep apnea)

## 2021-09-29 DIAGNOSIS — R09.81 NASAL CONGESTION: ICD-10-CM

## 2021-09-29 RX ORDER — FLUTICASONE PROPIONATE 50 MCG
2 SPRAY, SUSPENSION (ML) NASAL DAILY PRN
Qty: 16 ML | Refills: 0 | Status: SHIPPED | OUTPATIENT
Start: 2021-09-29 | End: 2021-10-26

## 2021-09-29 NOTE — TELEPHONE ENCOUNTER
Allergy Medication Protocol Passed 09/29/2021 02:03 PM    Appointment in the past 12 or next 3 months          FLUTICASONE PROPIONATE 50 MCG/ACT Nasal Suspension  Last refilled 8/27/21 #16ml with 0 rf.     LOV-9/20/21  Last labs-4/3//21      Refilled per p

## 2021-10-20 ENCOUNTER — TELEPHONE (OUTPATIENT)
Dept: FAMILY MEDICINE CLINIC | Facility: CLINIC | Age: 51
End: 2021-10-20

## 2021-10-26 DIAGNOSIS — R09.81 NASAL CONGESTION: ICD-10-CM

## 2021-10-26 RX ORDER — FLUTICASONE PROPIONATE 50 MCG
SPRAY, SUSPENSION (ML) NASAL
Qty: 16 ML | Refills: 0 | Status: SHIPPED | OUTPATIENT
Start: 2021-10-26 | End: 2021-11-24

## 2021-10-26 NOTE — TELEPHONE ENCOUNTER
Allergy Medication Protocol Passed 10/26/2021 12:31 PM    Appointment in the past 12 or next 3 months     Refilled per refill protocol.

## 2021-11-24 DIAGNOSIS — R09.81 NASAL CONGESTION: ICD-10-CM

## 2021-11-24 RX ORDER — FLUTICASONE PROPIONATE 50 MCG
SPRAY, SUSPENSION (ML) NASAL
Qty: 16 ML | Refills: 0 | Status: SHIPPED | OUTPATIENT
Start: 2021-11-24 | End: 2021-12-21

## 2021-11-24 NOTE — TELEPHONE ENCOUNTER
Last refilled 10/26/21 for 6 ml with 0 RF  LOV with MM 9/20/21  No future appt   Allergy Medication Protocol Passed 11/24/2021 11:31 AM    Appointment in the past 12 or next 3 months

## 2021-12-21 DIAGNOSIS — R09.81 NASAL CONGESTION: ICD-10-CM

## 2021-12-21 RX ORDER — FLUTICASONE PROPIONATE 50 MCG
SPRAY, SUSPENSION (ML) NASAL
Qty: 16 ML | Refills: 0 | Status: SHIPPED | OUTPATIENT
Start: 2021-12-21 | End: 2022-01-15

## 2022-01-15 DIAGNOSIS — R09.81 NASAL CONGESTION: ICD-10-CM

## 2022-01-15 RX ORDER — FLUTICASONE PROPIONATE 50 MCG
SPRAY, SUSPENSION (ML) NASAL
Qty: 16 ML | Refills: 1 | Status: SHIPPED | OUTPATIENT
Start: 2022-01-15 | End: 2022-02-07

## 2022-01-26 RX ORDER — ROPINIROLE 0.25 MG/1
TABLET, FILM COATED ORAL
Qty: 11 TABLET | Refills: 0 | Status: SHIPPED | OUTPATIENT
Start: 2022-01-26 | End: 2022-02-07

## 2022-01-27 RX ORDER — TRAZODONE HYDROCHLORIDE 100 MG/1
100 TABLET ORAL NIGHTLY
Qty: 90 TABLET | Refills: 3 | Status: SHIPPED | OUTPATIENT
Start: 2022-01-27

## 2022-02-07 RX ORDER — ROPINIROLE 0.25 MG/1
TABLET, FILM COATED ORAL
Qty: 11 TABLET | Refills: 0 | OUTPATIENT
Start: 2022-02-07

## 2022-02-07 RX ORDER — ROPINIROLE 0.5 MG/1
0.5 TABLET, FILM COATED ORAL EVERY EVENING
Qty: 90 TABLET | Refills: 3 | Status: SHIPPED | OUTPATIENT
Start: 2022-02-07

## 2022-02-07 RX ORDER — FLUTICASONE PROPIONATE 50 MCG
SPRAY, SUSPENSION (ML) NASAL
Qty: 16 ML | Refills: 1 | Status: SHIPPED | OUTPATIENT
Start: 2022-02-07

## 2022-02-07 NOTE — TELEPHONE ENCOUNTER
Last OV 9/20/21    Sig on current med states patient is weaning off. Per review of epic, patient was to try weaning off to see if that was cause of muscle cramps. (See 2-23-21 tel enc)    Called and spoke with patient who states she did not stop medication has been taking Ropinerole 0.5 mg nightly for years. Would like correct script sent.   ropinereole 0.5 mg last refilled 1-19-21  #90  3 refills     0.25 mg script denied to pharmacy

## 2022-02-18 ENCOUNTER — OFFICE VISIT (OUTPATIENT)
Dept: FAMILY MEDICINE CLINIC | Facility: CLINIC | Age: 52
End: 2022-02-18
Payer: COMMERCIAL

## 2022-02-18 VITALS
SYSTOLIC BLOOD PRESSURE: 118 MMHG | BODY MASS INDEX: 51 KG/M2 | OXYGEN SATURATION: 98 % | WEIGHT: 293 LBS | TEMPERATURE: 97 F | DIASTOLIC BLOOD PRESSURE: 72 MMHG | RESPIRATION RATE: 18 BRPM | HEART RATE: 91 BPM

## 2022-02-18 DIAGNOSIS — Z02.79 ISSUE OF FITNESS CERTIFICATE: ICD-10-CM

## 2022-02-18 DIAGNOSIS — L81.9 DISCOLORATION OF SKIN OF MULTIPLE SITES OF LOWER EXTREMITY: ICD-10-CM

## 2022-02-18 DIAGNOSIS — Z13.1 SCREENING FOR DIABETES MELLITUS: Primary | ICD-10-CM

## 2022-02-18 DIAGNOSIS — Z13.220 SCREENING FOR LIPID DISORDERS: ICD-10-CM

## 2022-02-18 PROCEDURE — 3074F SYST BP LT 130 MM HG: CPT | Performed by: FAMILY MEDICINE

## 2022-02-18 PROCEDURE — 3078F DIAST BP <80 MM HG: CPT | Performed by: FAMILY MEDICINE

## 2022-02-18 PROCEDURE — 99214 OFFICE O/P EST MOD 30 MIN: CPT | Performed by: FAMILY MEDICINE

## 2022-02-18 RX ORDER — RIBOFLAVIN (VITAMIN B2) 100 MG
100 TABLET ORAL DAILY
COMMUNITY

## 2022-02-18 NOTE — PROGRESS NOTES
Paperwork provided by pt - from [employer]"    Dr. Adry Eric reviewed and completed.     Faxed to #[phone]  Attn: Anton Seals, HR    Copy send to scanning

## 2022-02-21 ENCOUNTER — TELEPHONE (OUTPATIENT)
Dept: FAMILY MEDICINE CLINIC | Facility: CLINIC | Age: 52
End: 2022-02-21

## 2022-02-21 NOTE — TELEPHONE ENCOUNTER
Pt called she was seen on Friday and has paperwork that the dr was to be filling out for her. She has not heard anything about if it was completed.  Please call back,thanks

## 2022-02-21 NOTE — TELEPHONE ENCOUNTER
Pt advised that Nitin paperwork - completed and signed by Dr. Nathen Smallwood    Attempted to fax to fax # listed on form - fax #[phone]  1st attempt 02/18/22 - failed transmission  2nd attempt 02/21/22 - failed transmission    Pt advised of note above - she reports that wrong fax number may be on the form  Pt reports she will  original copy and give to her employer. Pt's spouse to  paperwork per pt.     Placed in pt blue book for pt     Send copy to scanning

## 2022-03-05 RX ORDER — FLUTICASONE PROPIONATE 50 MCG
SPRAY, SUSPENSION (ML) NASAL
Qty: 16 ML | Refills: 1 | OUTPATIENT
Start: 2022-03-05

## 2022-04-04 ENCOUNTER — TELEPHONE (OUTPATIENT)
Dept: FAMILY MEDICINE CLINIC | Facility: CLINIC | Age: 52
End: 2022-04-04

## 2022-04-04 NOTE — TELEPHONE ENCOUNTER
Patient is getting labs done on Thursday at [address] [place] [address] in [place]. She requests we fax over order.  I wasn't sure if we should fax just most recent orders or the ones from Sept too    Please fax as appropriate      Thank you

## 2022-04-04 NOTE — TELEPHONE ENCOUNTER
Advised pt lab orders placed for [facility] - they should have access to orders, advised pt there are 5 tests total - she v/u    Pt requesting to cancel future appt - will call back to reschedule    No future appointments.

## 2022-04-08 ENCOUNTER — TELEPHONE (OUTPATIENT)
Dept: FAMILY MEDICINE CLINIC | Facility: CLINIC | Age: 52
End: 2022-04-08

## 2022-04-08 LAB
CHOL/HDLC RATIO: 2.1 (CALC)
CHOLESTEROL, TOTAL: 148 MG/DL
HDL CHOLESTEROL: 69 MG/DL
HEMOGLOBIN A1C: 5.4 % OF TOTAL HGB
LDL-CHOLESTEROL: 60 MG/DL (CALC)
NON-HDL CHOLESTEROL: 79 MG/DL (CALC)
TRIGLYCERIDES: 103 MG/DL

## 2022-04-08 NOTE — TELEPHONE ENCOUNTER
Patient advised of Doctor's note below. Patient verbalized understanding. No further questions at this time.     Future Appointments   Date Time Provider Antonia Keene   5/7/2022  9:15 AM Jacinda Panchal MD [facility] YASSINE Viera

## 2022-04-08 NOTE — TELEPHONE ENCOUNTER
----- Message from Ynes Edgar MD sent at 4/8/2022 11:11 AM CDT -----  [facility] sent     Your cholesterol and the A1C look beautiful. ~ MM   Written by Stevo Tucker MD on 4/8/2022 11:11 AM CD

## 2022-04-13 ENCOUNTER — APPOINTMENT (OUTPATIENT)
Dept: DERMATOLOGY | Age: 52
End: 2022-04-13

## 2022-05-07 ENCOUNTER — OFFICE VISIT (OUTPATIENT)
Dept: FAMILY MEDICINE CLINIC | Facility: CLINIC | Age: 52
End: 2022-05-07
Payer: COMMERCIAL

## 2022-05-07 VITALS
TEMPERATURE: 98 F | HEART RATE: 80 BPM | OXYGEN SATURATION: 98 % | BODY MASS INDEX: 51.27 KG/M2 | DIASTOLIC BLOOD PRESSURE: 84 MMHG | HEIGHT: 63.5 IN | WEIGHT: 293 LBS | SYSTOLIC BLOOD PRESSURE: 134 MMHG | RESPIRATION RATE: 18 BRPM

## 2022-05-07 DIAGNOSIS — L81.9 DISCOLORATION OF SKIN OF MULTIPLE SITES OF LOWER EXTREMITY: ICD-10-CM

## 2022-05-07 DIAGNOSIS — Z00.00 WELLNESS EXAMINATION: Primary | ICD-10-CM

## 2022-05-07 DIAGNOSIS — E66.01 MORBID OBESITY WITH BMI OF 50.0-59.9, ADULT (HCC): ICD-10-CM

## 2022-05-07 DIAGNOSIS — Z12.4 PAPANICOLAOU SMEAR FOR CERVICAL CANCER SCREENING: ICD-10-CM

## 2022-05-07 DIAGNOSIS — Z12.11 SCREENING FOR COLON CANCER: ICD-10-CM

## 2022-05-07 DIAGNOSIS — Z12.31 SCREENING MAMMOGRAM FOR BREAST CANCER: ICD-10-CM

## 2022-05-07 PROCEDURE — 3075F SYST BP GE 130 - 139MM HG: CPT | Performed by: FAMILY MEDICINE

## 2022-05-07 PROCEDURE — 99396 PREV VISIT EST AGE 40-64: CPT | Performed by: FAMILY MEDICINE

## 2022-05-07 PROCEDURE — 87624 HPV HI-RISK TYP POOLED RSLT: CPT | Performed by: FAMILY MEDICINE

## 2022-05-07 PROCEDURE — 3079F DIAST BP 80-89 MM HG: CPT | Performed by: FAMILY MEDICINE

## 2022-05-07 PROCEDURE — 3008F BODY MASS INDEX DOCD: CPT | Performed by: FAMILY MEDICINE

## 2022-05-07 PROCEDURE — 88175 CYTOPATH C/V AUTO FLUID REDO: CPT | Performed by: FAMILY MEDICINE

## 2022-05-08 LAB
ABSOLUTE BASOPHILS: 41 CELLS/UL (ref 0–200)
ABSOLUTE EOSINOPHILS: 173 CELLS/UL (ref 15–500)
ABSOLUTE LYMPHOCYTES: 2546 CELLS/UL (ref 850–3900)
ABSOLUTE MONOCYTES: 400 CELLS/UL (ref 200–950)
ABSOLUTE NEUTROPHILS: 3740 CELLS/UL (ref 1500–7800)
ALBUMIN/GLOBULIN RATIO: 1.4 (CALC) (ref 1–2.5)
ALBUMIN: 4.1 G/DL (ref 3.6–5.1)
ALKALINE PHOSPHATASE: 79 U/L (ref 37–153)
ALT: 18 U/L (ref 6–29)
AST: 13 U/L (ref 10–35)
BASOPHILS: 0.6 %
BILIRUBIN, TOTAL: 0.6 MG/DL (ref 0.2–1.2)
BUN: 15 MG/DL (ref 7–25)
CALCIUM: 9.2 MG/DL (ref 8.6–10.4)
CARBON DIOXIDE: 29 MMOL/L (ref 20–32)
CHLORIDE: 104 MMOL/L (ref 98–110)
CREATININE: 0.93 MG/DL (ref 0.5–1.05)
EGFR IF AFRICN AM: 83 ML/MIN/1.73M2
EGFR IF NONAFRICN AM: 72 ML/MIN/1.73M2
EOSINOPHILS: 2.5 %
GLOBULIN: 2.9 G/DL (CALC) (ref 1.9–3.7)
GLUCOSE: 91 MG/DL (ref 65–99)
HEMATOCRIT: 42.8 % (ref 35–45)
HEMOGLOBIN: 14.2 G/DL (ref 11.7–15.5)
LYMPHOCYTES: 36.9 %
MCH: 29 PG (ref 27–33)
MCHC: 33.2 G/DL (ref 32–36)
MCV: 87.5 FL (ref 80–100)
MONOCYTES: 5.8 %
MPV: 11.9 FL (ref 7.5–12.5)
NEUTROPHILS: 54.2 %
PLATELET COUNT: 183 THOUSAND/UL (ref 140–400)
POTASSIUM: 4.3 MMOL/L (ref 3.5–5.3)
PROTEIN, TOTAL: 7 G/DL (ref 6.1–8.1)
RDW: 13 % (ref 11–15)
RED BLOOD CELL COUNT: 4.89 MILLION/UL (ref 3.8–5.1)
SODIUM: 142 MMOL/L (ref 135–146)
TSH W/REFLEX TO FT4: 1.38 MIU/L
WHITE BLOOD CELL COUNT: 6.9 THOUSAND/UL (ref 3.8–10.8)

## 2022-05-09 LAB — HPV I/H RISK 1 DNA SPEC QL NAA+PROBE: NEGATIVE

## 2022-05-10 ENCOUNTER — TELEPHONE (OUTPATIENT)
Dept: FAMILY MEDICINE CLINIC | Facility: CLINIC | Age: 52
End: 2022-05-10

## 2022-05-12 ENCOUNTER — TELEPHONE (OUTPATIENT)
Dept: FAMILY MEDICINE CLINIC | Facility: CLINIC | Age: 52
End: 2022-05-12

## 2022-05-12 NOTE — TELEPHONE ENCOUNTER
Advised patient of Doctor's note below. Patient verbalized understanding. No further questions at this time.     Pt requesting appt to discuss weight loss options, requesting saturdays    Future Appointments   Date Time Provider Antonia Keene   6/11/2022 11:00 AM Ynes Castellanos MD [facility] Karine Jean

## 2022-05-12 NOTE — TELEPHONE ENCOUNTER
----- Message from Ynes Meza MD sent at 5/11/2022 11:06 PM CDT -----  Pap smear - normal and no evidence of the high risk HPV virus. We still highly recommend annual physical exams, however, if there are no changes to sexual health / history PAP smears may be done every 3-5 years.

## 2022-05-13 ENCOUNTER — TELEPHONE (OUTPATIENT)
Dept: FAMILY MEDICINE CLINIC | Facility: CLINIC | Age: 52
End: 2022-05-13

## 2022-05-13 NOTE — TELEPHONE ENCOUNTER
Advised patient of Doctor's note below. Patient verbalized understanding. No further questions at this time.     Please refer to TE 05/12/22

## 2022-05-13 NOTE — TELEPHONE ENCOUNTER
----- Message from Ynes Henry MD sent at 5/9/2022 11:51 AM CDT -----  [facility] sent     Rafia Harman  All your labs look good. Looking forward to discussing all your weight loss options at our next visit.  Take care ~ MM   Written by Ynes Henry MD on 5/9/2022 11:51 AM CDT

## 2022-05-16 RX ORDER — FLUTICASONE PROPIONATE 50 MCG
SPRAY, SUSPENSION (ML) NASAL
Qty: 16 ML | Refills: 1 | Status: SHIPPED | OUTPATIENT
Start: 2022-05-16

## 2022-05-16 NOTE — TELEPHONE ENCOUNTER
Allergy Medication Protocol Passed 05/15/2022 10:31 AM    Appointment in the past 12 or next 3 months     Refilled per protocol  FLUTICASONE PROPIONATE 50 MCG/ACT Nasal Suspension  Last refilled on 2/7/22 #16 with 1 rf.   LOV- 5/7/22  Last labs- 5/7/22    Sent to pharmacy

## 2022-05-31 ENCOUNTER — OFFICE VISIT (OUTPATIENT)
Dept: DERMATOLOGY | Age: 52
End: 2022-05-31

## 2022-05-31 DIAGNOSIS — D48.5 NEOPLASM OF UNCERTAIN BEHAVIOR OF SKIN: Primary | ICD-10-CM

## 2022-05-31 DIAGNOSIS — L81.4 SOLAR LENTIGO: ICD-10-CM

## 2022-05-31 DIAGNOSIS — Z12.83 SCREENING EXAM FOR SKIN CANCER: ICD-10-CM

## 2022-05-31 DIAGNOSIS — L91.8 ACROCHORDON: ICD-10-CM

## 2022-05-31 PROCEDURE — 11200 RMVL SKIN TAGS UP TO&INC 15: CPT | Performed by: DERMATOLOGY

## 2022-05-31 PROCEDURE — 11301 SHAVE SKIN LESION 0.6-1.0 CM: CPT | Performed by: DERMATOLOGY

## 2022-05-31 PROCEDURE — 11201 RMVL SKIN TAGS EA ADDL 10: CPT | Performed by: DERMATOLOGY

## 2022-05-31 PROCEDURE — 99203 OFFICE O/P NEW LOW 30 MIN: CPT | Performed by: DERMATOLOGY

## 2022-06-02 LAB — PATH REPORT PLASRBC-IMP: NORMAL

## 2022-06-07 DIAGNOSIS — R09.81 NASAL CONGESTION: ICD-10-CM

## 2022-06-07 RX ORDER — FLUTICASONE PROPIONATE 50 MCG
SPRAY, SUSPENSION (ML) NASAL
Qty: 16 ML | Refills: 1 | Status: SHIPPED | OUTPATIENT
Start: 2022-06-07

## 2022-06-11 ENCOUNTER — OFFICE VISIT (OUTPATIENT)
Dept: FAMILY MEDICINE CLINIC | Facility: CLINIC | Age: 52
End: 2022-06-11
Payer: COMMERCIAL

## 2022-06-11 VITALS
TEMPERATURE: 97 F | WEIGHT: 293 LBS | OXYGEN SATURATION: 95 % | HEIGHT: 63.5 IN | DIASTOLIC BLOOD PRESSURE: 72 MMHG | HEART RATE: 87 BPM | BODY MASS INDEX: 51.27 KG/M2 | SYSTOLIC BLOOD PRESSURE: 112 MMHG

## 2022-06-11 DIAGNOSIS — Z71.3 WEIGHT LOSS COUNSELING, ENCOUNTER FOR: ICD-10-CM

## 2022-06-11 DIAGNOSIS — E66.01 MORBID OBESITY WITH BMI OF 50.0-59.9, ADULT (HCC): ICD-10-CM

## 2022-06-11 DIAGNOSIS — L25.5 TOXICODENDRON DERMATITIS: Primary | ICD-10-CM

## 2022-06-11 DIAGNOSIS — Z76.89 ENCOUNTER PRIOR TO INITIATION OF MEDICATION: ICD-10-CM

## 2022-06-11 PROCEDURE — 99214 OFFICE O/P EST MOD 30 MIN: CPT | Performed by: FAMILY MEDICINE

## 2022-06-11 PROCEDURE — 3078F DIAST BP <80 MM HG: CPT | Performed by: FAMILY MEDICINE

## 2022-06-11 PROCEDURE — 3008F BODY MASS INDEX DOCD: CPT | Performed by: FAMILY MEDICINE

## 2022-06-11 PROCEDURE — 3074F SYST BP LT 130 MM HG: CPT | Performed by: FAMILY MEDICINE

## 2022-06-11 RX ORDER — PHENTERMINE HYDROCHLORIDE 37.5 MG/1
TABLET ORAL
Qty: 30 TABLET | Refills: 0 | Status: SHIPPED | OUTPATIENT
Start: 2022-06-11 | End: 2022-07-11

## 2022-06-14 ENCOUNTER — TELEPHONE (OUTPATIENT)
Dept: FAMILY MEDICINE CLINIC | Facility: CLINIC | Age: 52
End: 2022-06-14

## 2022-06-14 NOTE — TELEPHONE ENCOUNTER
[facility] [address] [phone], [address] [facility] [address]   Phone: [phone] Fax: [phone]   Hours: Not open 24 hours     PATIENT CALLING STATING RX FOR POISON IVY HASN'T BEEN CALLED IN YET

## 2022-07-03 DIAGNOSIS — R09.81 NASAL CONGESTION: ICD-10-CM

## 2022-07-04 RX ORDER — FLUTICASONE PROPIONATE 50 MCG
SPRAY, SUSPENSION (ML) NASAL
Qty: 16 ML | Refills: 1 | Status: SHIPPED | OUTPATIENT
Start: 2022-07-04

## 2022-07-04 NOTE — TELEPHONE ENCOUNTER
Allergy Medication Protocol Passed 07/03/2022 08:30 AM    Appointment in the past 12 or next 3 months        LOV  6/11/22  Future Appointments   Date Time Provider Antonia Keene   8/6/2022 10:00 AM Carlin Woodard MD [facility] YASSINE Leslie

## 2022-07-30 DIAGNOSIS — R09.81 NASAL CONGESTION: ICD-10-CM

## 2022-08-01 RX ORDER — FLUTICASONE PROPIONATE 50 MCG
SPRAY, SUSPENSION (ML) NASAL
Qty: 16 ML | Refills: 1 | Status: SHIPPED | OUTPATIENT
Start: 2022-08-01

## 2022-08-15 DIAGNOSIS — R09.81 NASAL CONGESTION: ICD-10-CM

## 2022-08-15 RX ORDER — FLUTICASONE PROPIONATE 50 MCG
2 SPRAY, SUSPENSION (ML) NASAL DAILY PRN
Qty: 16 ML | Refills: 1 | Status: SHIPPED | OUTPATIENT
Start: 2022-08-15 | End: 2022-11-13

## 2022-08-15 NOTE — TELEPHONE ENCOUNTER
Received fax from [facility] regarding refill request - 90 day supply request    LOV 06/11/22  Last refill on 08/01/2022, for #16mL, with 1 refills  FLUTICASONE PROPIONATE 50 MCG/ACT Nasal Suspension    Future Appointments   Date Time Provider Antonia Keene   8/18/2022  3:00 PM Ynes Billingsley MD [facility] YASSINE Longo Likes       Order(s) pending, please review. Thank you.

## 2022-08-18 ENCOUNTER — OFFICE VISIT (OUTPATIENT)
Dept: FAMILY MEDICINE CLINIC | Facility: CLINIC | Age: 52
End: 2022-08-18
Payer: COMMERCIAL

## 2022-08-18 VITALS
TEMPERATURE: 97 F | DIASTOLIC BLOOD PRESSURE: 78 MMHG | OXYGEN SATURATION: 97 % | SYSTOLIC BLOOD PRESSURE: 112 MMHG | WEIGHT: 290.5 LBS | BODY MASS INDEX: 51 KG/M2 | HEART RATE: 82 BPM | RESPIRATION RATE: 18 BRPM

## 2022-08-18 DIAGNOSIS — Z71.3 WEIGHT LOSS COUNSELING, ENCOUNTER FOR: ICD-10-CM

## 2022-08-18 DIAGNOSIS — Z76.89 ENCOUNTER PRIOR TO INITIATION OF MEDICATION: ICD-10-CM

## 2022-08-18 DIAGNOSIS — E66.01 MORBID OBESITY WITH BMI OF 50.0-59.9, ADULT (HCC): ICD-10-CM

## 2022-08-18 DIAGNOSIS — L25.5 TOXICODENDRON DERMATITIS: Primary | ICD-10-CM

## 2022-08-18 PROCEDURE — 99214 OFFICE O/P EST MOD 30 MIN: CPT | Performed by: FAMILY MEDICINE

## 2022-08-18 PROCEDURE — 3078F DIAST BP <80 MM HG: CPT | Performed by: FAMILY MEDICINE

## 2022-08-18 PROCEDURE — 3074F SYST BP LT 130 MM HG: CPT | Performed by: FAMILY MEDICINE

## 2022-08-18 RX ORDER — TRIAMCINOLONE ACETONIDE 1 MG/G
CREAM TOPICAL 2 TIMES DAILY PRN
Qty: 120 G | Refills: 0 | Status: SHIPPED | OUTPATIENT
Start: 2022-08-18 | End: 2022-11-16

## 2022-08-18 RX ORDER — PHENTERMINE HYDROCHLORIDE 37.5 MG/1
TABLET ORAL
Qty: 30 TABLET | Refills: 0 | Status: SHIPPED | OUTPATIENT
Start: 2022-08-18 | End: 2022-09-17

## 2022-09-10 DIAGNOSIS — E66.01 MORBID OBESITY WITH BMI OF 50.0-59.9, ADULT (HCC): ICD-10-CM

## 2022-09-10 DIAGNOSIS — Z76.89 ENCOUNTER PRIOR TO INITIATION OF MEDICATION: ICD-10-CM

## 2022-09-10 DIAGNOSIS — Z71.3 WEIGHT LOSS COUNSELING, ENCOUNTER FOR: ICD-10-CM

## 2022-09-10 NOTE — TELEPHONE ENCOUNTER
LOV 08/18/2022    Last refill on 08/18/2022, for #30 tabs, with 0 refills  Phentermine HCl 37.5 MG Oral Tab    No future appointments. Order(s) pending, please review. Thank you.

## 2022-09-11 RX ORDER — PHENTERMINE HYDROCHLORIDE 37.5 MG/1
TABLET ORAL
Qty: 30 TABLET | Refills: 0 | Status: SHIPPED | OUTPATIENT
Start: 2022-09-11

## 2022-12-29 ENCOUNTER — TELEPHONE (OUTPATIENT)
Dept: FAMILY MEDICINE CLINIC | Facility: CLINIC | Age: 52
End: 2022-12-29

## 2022-12-29 NOTE — TELEPHONE ENCOUNTER
Received request to send last 5 years of medical records to [employer] [facility] [address], [place], [address].  Sent to [employer] as Urgent

## 2023-01-16 RX ORDER — TRAZODONE HYDROCHLORIDE 100 MG/1
TABLET ORAL
Qty: 90 TABLET | Refills: 0 | Status: SHIPPED | OUTPATIENT
Start: 2023-01-16

## 2023-01-16 NOTE — TELEPHONE ENCOUNTER
Last OV:08/18/2022 sick, 05/07/2022 physical   Last refill:01/27/2022, 90 tabs, 3 refills    Medication pended, please sign if appropriate

## 2023-02-11 RX ORDER — ROPINIROLE 0.5 MG/1
0.5 TABLET, FILM COATED ORAL EVERY EVENING
Qty: 90 TABLET | Refills: 0 | Status: SHIPPED | OUTPATIENT
Start: 2023-02-11

## 2023-02-11 NOTE — TELEPHONE ENCOUNTER
No refill protocol for this medication. Last refill: 2/07/2022 #90 with 2 refills  Last Visit: 8/18/2022  Next Visit: No future appointments. Forward to Dennie Malay, APRN please advise on refills. Thanks.

## 2023-02-13 DIAGNOSIS — R09.81 NASAL CONGESTION: ICD-10-CM

## 2023-02-13 RX ORDER — FLUTICASONE PROPIONATE 50 MCG
SPRAY, SUSPENSION (ML) NASAL
Qty: 48 ML | Refills: 1 | Status: SHIPPED | OUTPATIENT
Start: 2023-02-13

## 2023-02-20 ENCOUNTER — OFFICE VISIT (OUTPATIENT)
Dept: FAMILY MEDICINE CLINIC | Facility: CLINIC | Age: 53
End: 2023-02-20
Payer: COMMERCIAL

## 2023-02-20 VITALS
BODY MASS INDEX: 41.82 KG/M2 | HEIGHT: 63.5 IN | WEIGHT: 239 LBS | HEART RATE: 94 BPM | SYSTOLIC BLOOD PRESSURE: 132 MMHG | OXYGEN SATURATION: 97 % | DIASTOLIC BLOOD PRESSURE: 74 MMHG | TEMPERATURE: 98 F

## 2023-02-20 DIAGNOSIS — J01.00 ACUTE NON-RECURRENT MAXILLARY SINUSITIS: Primary | ICD-10-CM

## 2023-02-20 DIAGNOSIS — Z71.3 WEIGHT LOSS COUNSELING, ENCOUNTER FOR: ICD-10-CM

## 2023-02-20 DIAGNOSIS — E66.01 MORBID OBESITY WITH BMI OF 50.0-59.9, ADULT (HCC): ICD-10-CM

## 2023-02-20 DIAGNOSIS — H69.83 DYSFUNCTION OF BOTH EUSTACHIAN TUBES: ICD-10-CM

## 2023-02-20 PROBLEM — G20 PARKINSON'S DISEASE (HCC): Status: ACTIVE | Noted: 2023-02-20

## 2023-02-20 PROBLEM — G20.A1 PARKINSON'S DISEASE: Status: ACTIVE | Noted: 2023-02-20

## 2023-02-20 PROBLEM — G20.A1 PARKINSON'S DISEASE (HCC): Status: ACTIVE | Noted: 2023-02-20

## 2023-02-20 RX ORDER — PHENTERMINE HYDROCHLORIDE 37.5 MG/1
TABLET ORAL
Qty: 30 TABLET | Refills: 0 | Status: SHIPPED | OUTPATIENT
Start: 2023-02-20

## 2023-02-20 RX ORDER — CEFDINIR 300 MG/1
300 CAPSULE ORAL 2 TIMES DAILY
COMMUNITY
End: 2023-02-20

## 2023-02-20 RX ORDER — CEFDINIR 300 MG/1
300 CAPSULE ORAL 2 TIMES DAILY
Qty: 16 CAPSULE | Refills: 0 | Status: SHIPPED | OUTPATIENT
Start: 2023-02-20 | End: 2023-02-28

## 2023-02-23 ENCOUNTER — TELEPHONE (OUTPATIENT)
Dept: FAMILY MEDICINE CLINIC | Facility: CLINIC | Age: 53
End: 2023-02-23

## 2023-02-23 RX ORDER — AZITHROMYCIN 250 MG/1
TABLET, FILM COATED ORAL
Qty: 6 TABLET | Refills: 0 | Status: SHIPPED | OUTPATIENT
Start: 2023-02-23 | End: 2023-02-28

## 2023-02-23 NOTE — TELEPHONE ENCOUNTER
Advised patient of TORI's note below. Patient verbalized understanding. No further questions at this time.

## 2023-02-23 NOTE — TELEPHONE ENCOUNTER
PT CALLED TO ADV THAT SHE WOULD LIKE TO MAKE A NOTE IN HER CHART THAT SHE WOULD NOT LIKE TO TAKE CEFDINIR EVER AGAIN.      PT HAS ADV HAS HAD PRETTY BAD DIARRHEA EVER SINCE SHE HAS STARTED TAKING IT    PT ADV JUST FINALLY STARTED FEELING BETTER SO NO NEED FOR ANY OTHER MEDICATION BUT WOULD LIKE SOME OTHER RECOMMENDATIONS ON WHAT SHE CAN DO TO HELP THE DIARRHEA      PLEASE CALL AND ADV      THANK YOU

## 2023-02-23 NOTE — TELEPHONE ENCOUNTER
Diarrhea common side effect with antibiotics unfortunately. Can switch to Zpak for sinus infection. New Rx sent. Lowden diet. Avoid dairy. If becoming dehydrated, go to IC.  Also recommend probiotic x1 month after antibiotic

## 2023-02-23 NOTE — TELEPHONE ENCOUNTER
Pt requesting to put Cefdinir on allergy list - allergy list updated    Pt reports diarrhea for 4-5 days  Pt reports had cefdinir prior to Rx sent 02/20/23 - that's why decided to continue with same Rx    Pt reports eating toast and eggs    Advised pt that diarrhea is common side effect of abx; advised to take probiotics, BRAT diet and avoid eggs at this time - she v/u    Pt has been taking OTC imodium - as instructed - but no improvement  Pt drinking water, staying hydrated    Advised pt that Dr. Gold Alva not in office today, will have covering provider address - she v/u    Looking for recommendations  Please advise, thank you

## 2023-03-13 NOTE — TELEPHONE ENCOUNTER
Allergy Medication Protocol Passed 02/13/2023 09:26 AM    Appointment in the past 12 or next 3 months       Order per protocol
No risk alerts present

## 2023-04-14 ENCOUNTER — OFFICE VISIT (OUTPATIENT)
Dept: FAMILY MEDICINE CLINIC | Facility: CLINIC | Age: 53
End: 2023-04-14
Payer: COMMERCIAL

## 2023-04-14 VITALS
TEMPERATURE: 97 F | BODY MASS INDEX: 50.02 KG/M2 | SYSTOLIC BLOOD PRESSURE: 130 MMHG | HEIGHT: 63.98 IN | WEIGHT: 293 LBS | RESPIRATION RATE: 20 BRPM | HEART RATE: 85 BPM | DIASTOLIC BLOOD PRESSURE: 80 MMHG | OXYGEN SATURATION: 97 %

## 2023-04-14 DIAGNOSIS — G47.33 OSA (OBSTRUCTIVE SLEEP APNEA): ICD-10-CM

## 2023-04-14 DIAGNOSIS — G47.61 PLMD (PERIODIC LIMB MOVEMENT DISORDER): ICD-10-CM

## 2023-04-14 DIAGNOSIS — H93.8X2 IRRITATION OF EAR, LEFT: Primary | ICD-10-CM

## 2023-04-14 DIAGNOSIS — E55.9 VITAMIN D DEFICIENCY: ICD-10-CM

## 2023-04-14 DIAGNOSIS — F51.04 CHRONIC INSOMNIA: ICD-10-CM

## 2023-04-14 DIAGNOSIS — E66.01 MORBID OBESITY WITH BMI OF 50.0-59.9, ADULT (HCC): ICD-10-CM

## 2023-04-14 PROCEDURE — 3008F BODY MASS INDEX DOCD: CPT | Performed by: NURSE PRACTITIONER

## 2023-04-14 PROCEDURE — 3079F DIAST BP 80-89 MM HG: CPT | Performed by: NURSE PRACTITIONER

## 2023-04-14 PROCEDURE — 3075F SYST BP GE 130 - 139MM HG: CPT | Performed by: NURSE PRACTITIONER

## 2023-04-14 PROCEDURE — 99214 OFFICE O/P EST MOD 30 MIN: CPT | Performed by: NURSE PRACTITIONER

## 2023-04-14 RX ORDER — TRAZODONE HYDROCHLORIDE 100 MG/1
100 TABLET ORAL NIGHTLY
Qty: 90 TABLET | Refills: 0 | Status: SHIPPED | OUTPATIENT
Start: 2023-04-14

## 2023-04-14 RX ORDER — ROPINIROLE 0.5 MG/1
0.5 TABLET, FILM COATED ORAL EVERY EVENING
Qty: 90 TABLET | Refills: 0 | Status: SHIPPED | OUTPATIENT
Start: 2023-04-14

## 2023-07-15 DIAGNOSIS — Z12.31 SCREENING MAMMOGRAM FOR BREAST CANCER: Primary | ICD-10-CM

## 2023-07-15 DIAGNOSIS — F51.04 CHRONIC INSOMNIA: ICD-10-CM

## 2023-07-15 RX ORDER — TRAZODONE HYDROCHLORIDE 100 MG/1
100 TABLET ORAL NIGHTLY
Qty: 90 TABLET | Refills: 0 | Status: SHIPPED | OUTPATIENT
Start: 2023-07-15

## 2023-07-15 NOTE — TELEPHONE ENCOUNTER
LRF-4/114/23 #90+0  LOV-4/14/23 Ear, 2/20/23 sinus, 5/7/22 Well Exam  Future Appointments   Date Time Provider Antonia Keene   8/22/2023 11:00 AM Gilberto [address]

## 2023-07-15 NOTE — TELEPHONE ENCOUNTER
Patient has been notified, verbalized understanding of information. Denies further questions. She will call back to schedule.

## 2023-08-07 DIAGNOSIS — G47.61 PLMD (PERIODIC LIMB MOVEMENT DISORDER): ICD-10-CM

## 2023-08-07 NOTE — TELEPHONE ENCOUNTER
LOV 04/14/23 w/ APRN  Last refill on 04/14/23, for #90 tabs, with 0 refills  rOPINIRole 0.5 MG Oral Tab     Future Appointments   Date Time Provider Antonia Keene   8/22/2023 11:00 AM Ernestina Allison PA-C [facility]     Order(s) pending, please review. Thank you.

## 2023-08-08 RX ORDER — ROPINIROLE 0.5 MG/1
0.5 TABLET, FILM COATED ORAL EVERY EVENING
Qty: 90 TABLET | Refills: 0 | Status: SHIPPED | OUTPATIENT
Start: 2023-08-08

## 2023-08-08 NOTE — TELEPHONE ENCOUNTER
Rx refilled    Please remind patient she is overdue for screening mammogram. Order in place    She is also due for screening colonoscopy. Can place referral if she would like.

## 2023-08-09 NOTE — TELEPHONE ENCOUNTER
Spoke to pt advising refill was sent. Advised mamm was placed and central scheduling number given. Pt declined doing colonoscopy.  No further questions

## 2023-08-18 ENCOUNTER — HOSPITAL ENCOUNTER (OUTPATIENT)
Dept: MAMMOGRAPHY | Age: 53
Discharge: HOME | End: 2023-08-18
Attending: NURSE PRACTITIONER
Payer: COMMERCIAL

## 2023-08-18 DIAGNOSIS — Z12.31 SCREENING MAMMOGRAM FOR BREAST CANCER: ICD-10-CM

## 2023-08-18 PROCEDURE — 77063 BREAST TOMOSYNTHESIS BI: CPT | Performed by: NURSE PRACTITIONER

## 2023-08-18 PROCEDURE — 77067 SCR MAMMO BI INCL CAD: CPT | Performed by: NURSE PRACTITIONER

## 2023-08-22 ENCOUNTER — TELEMEDICINE (OUTPATIENT)
Dept: INTERNAL MEDICINE CLINIC | Facility: CLINIC | Age: 53
End: 2023-08-22
Payer: COMMERCIAL

## 2023-08-22 DIAGNOSIS — G47.33 OSA ON CPAP: ICD-10-CM

## 2023-08-22 DIAGNOSIS — E55.9 VITAMIN D DEFICIENCY: ICD-10-CM

## 2023-08-22 DIAGNOSIS — Z51.81 ENCOUNTER FOR THERAPEUTIC DRUG LEVEL MONITORING: Primary | ICD-10-CM

## 2023-08-22 DIAGNOSIS — E66.01 CLASS 3 SEVERE OBESITY WITH BODY MASS INDEX (BMI) OF 50.0 TO 59.9 IN ADULT, UNSPECIFIED OBESITY TYPE, UNSPECIFIED WHETHER SERIOUS COMORBIDITY PRESENT (HCC): ICD-10-CM

## 2023-08-22 DIAGNOSIS — F43.9 STRESS: ICD-10-CM

## 2023-08-22 DIAGNOSIS — E53.8 LOW SERUM VITAMIN B12: ICD-10-CM

## 2023-08-22 PROCEDURE — 99204 OFFICE O/P NEW MOD 45 MIN: CPT | Performed by: PHYSICIAN ASSISTANT

## 2023-08-24 ENCOUNTER — OFFICE VISIT (OUTPATIENT)
Dept: FAMILY MEDICINE CLINIC | Facility: CLINIC | Age: 53
End: 2023-08-24
Payer: COMMERCIAL

## 2023-08-24 VITALS
WEIGHT: 293 LBS | DIASTOLIC BLOOD PRESSURE: 80 MMHG | HEART RATE: 79 BPM | SYSTOLIC BLOOD PRESSURE: 122 MMHG | BODY MASS INDEX: 50.02 KG/M2 | RESPIRATION RATE: 16 BRPM | TEMPERATURE: 97 F | OXYGEN SATURATION: 97 % | HEIGHT: 64 IN

## 2023-08-24 DIAGNOSIS — L23.7 POISON IVY: ICD-10-CM

## 2023-08-24 DIAGNOSIS — Z12.11 SCREENING FOR COLON CANCER: ICD-10-CM

## 2023-08-24 DIAGNOSIS — Z12.4 SCREENING FOR CERVICAL CANCER: ICD-10-CM

## 2023-08-24 DIAGNOSIS — Z00.00 ANNUAL PHYSICAL EXAM: Primary | ICD-10-CM

## 2023-08-24 DIAGNOSIS — Z23 ENCOUNTER FOR IMMUNIZATION: ICD-10-CM

## 2023-08-24 LAB
ALBUMIN SERPL-MCNC: 3.7 G/DL (ref 3.4–5)
ALBUMIN/GLOB SERPL: 0.9 {RATIO} (ref 1–2)
ALP LIVER SERPL-CCNC: 89 U/L
ALT SERPL-CCNC: 34 U/L
ANION GAP SERPL CALC-SCNC: 5 MMOL/L (ref 0–18)
AST SERPL-CCNC: 15 U/L (ref 15–37)
BASOPHILS # BLD AUTO: 0.04 X10(3) UL (ref 0–0.2)
BASOPHILS NFR BLD AUTO: 0.5 %
BILIRUB SERPL-MCNC: 0.6 MG/DL (ref 0.1–2)
BUN BLD-MCNC: 16 MG/DL (ref 7–18)
CALCIUM BLD-MCNC: 9.3 MG/DL (ref 8.5–10.1)
CHLORIDE SERPL-SCNC: 107 MMOL/L (ref 98–112)
CHOLEST SERPL-MCNC: 157 MG/DL (ref ?–200)
CO2 SERPL-SCNC: 27 MMOL/L (ref 21–32)
CREAT BLD-MCNC: 0.87 MG/DL
EGFRCR SERPLBLD CKD-EPI 2021: 80 ML/MIN/1.73M2 (ref 60–?)
EOSINOPHIL # BLD AUTO: 0.18 X10(3) UL (ref 0–0.7)
EOSINOPHIL NFR BLD AUTO: 2.1 %
ERYTHROCYTE [DISTWIDTH] IN BLOOD BY AUTOMATED COUNT: 13 %
FASTING PATIENT LIPID ANSWER: NO
FASTING STATUS PATIENT QL REPORTED: NO
GLOBULIN PLAS-MCNC: 4.1 G/DL (ref 2.8–4.4)
GLUCOSE BLD-MCNC: 84 MG/DL (ref 70–99)
HCT VFR BLD AUTO: 42.7 %
HDLC SERPL-MCNC: 64 MG/DL (ref 40–59)
HGB BLD-MCNC: 14.4 G/DL
IMM GRANULOCYTES # BLD AUTO: 0.02 X10(3) UL (ref 0–1)
IMM GRANULOCYTES NFR BLD: 0.2 %
LDLC SERPL CALC-MCNC: 75 MG/DL (ref ?–100)
LYMPHOCYTES # BLD AUTO: 2.79 X10(3) UL (ref 1–4)
LYMPHOCYTES NFR BLD AUTO: 32.6 %
MCH RBC QN AUTO: 29.3 PG (ref 26–34)
MCHC RBC AUTO-ENTMCNC: 33.7 G/DL (ref 31–37)
MCV RBC AUTO: 86.8 FL
MONOCYTES # BLD AUTO: 0.57 X10(3) UL (ref 0.1–1)
MONOCYTES NFR BLD AUTO: 6.7 %
NEUTROPHILS # BLD AUTO: 4.96 X10 (3) UL (ref 1.5–7.7)
NEUTROPHILS # BLD AUTO: 4.96 X10(3) UL (ref 1.5–7.7)
NEUTROPHILS NFR BLD AUTO: 57.9 %
NONHDLC SERPL-MCNC: 93 MG/DL (ref ?–130)
OSMOLALITY SERPL CALC.SUM OF ELEC: 288 MOSM/KG (ref 275–295)
PLATELET # BLD AUTO: 192 10(3)UL (ref 150–450)
POTASSIUM SERPL-SCNC: 4.1 MMOL/L (ref 3.5–5.1)
PROT SERPL-MCNC: 7.8 G/DL (ref 6.4–8.2)
RBC # BLD AUTO: 4.92 X10(6)UL
SODIUM SERPL-SCNC: 139 MMOL/L (ref 136–145)
TRIGL SERPL-MCNC: 98 MG/DL (ref 30–149)
TSI SER-ACNC: 1.29 MIU/ML (ref 0.36–3.74)
VLDLC SERPL CALC-MCNC: 15 MG/DL (ref 0–30)
WBC # BLD AUTO: 8.6 X10(3) UL (ref 4–11)

## 2023-08-24 PROCEDURE — 3074F SYST BP LT 130 MM HG: CPT | Performed by: NURSE PRACTITIONER

## 2023-08-24 PROCEDURE — 90471 IMMUNIZATION ADMIN: CPT | Performed by: NURSE PRACTITIONER

## 2023-08-24 PROCEDURE — 88175 CYTOPATH C/V AUTO FLUID REDO: CPT | Performed by: NURSE PRACTITIONER

## 2023-08-24 PROCEDURE — 85025 COMPLETE CBC W/AUTO DIFF WBC: CPT | Performed by: NURSE PRACTITIONER

## 2023-08-24 PROCEDURE — 99396 PREV VISIT EST AGE 40-64: CPT | Performed by: NURSE PRACTITIONER

## 2023-08-24 PROCEDURE — 80061 LIPID PANEL: CPT | Performed by: NURSE PRACTITIONER

## 2023-08-24 PROCEDURE — 84443 ASSAY THYROID STIM HORMONE: CPT | Performed by: NURSE PRACTITIONER

## 2023-08-24 PROCEDURE — 3008F BODY MASS INDEX DOCD: CPT | Performed by: NURSE PRACTITIONER

## 2023-08-24 PROCEDURE — 90750 HZV VACC RECOMBINANT IM: CPT | Performed by: NURSE PRACTITIONER

## 2023-08-24 PROCEDURE — 80053 COMPREHEN METABOLIC PANEL: CPT | Performed by: NURSE PRACTITIONER

## 2023-08-24 PROCEDURE — 3079F DIAST BP 80-89 MM HG: CPT | Performed by: NURSE PRACTITIONER

## 2023-08-24 RX ORDER — CLOBETASOL PROPIONATE 0.5 MG/G
1 CREAM TOPICAL 2 TIMES DAILY PRN
Qty: 45 G | Refills: 0 | Status: SHIPPED | OUTPATIENT
Start: 2023-08-24

## 2023-08-25 ENCOUNTER — TELEPHONE (OUTPATIENT)
Dept: FAMILY MEDICINE CLINIC | Facility: CLINIC | Age: 53
End: 2023-08-25

## 2023-08-25 NOTE — TELEPHONE ENCOUNTER
----- Message from TORI Diaz sent at 8/25/2023  7:28 AM CDT -----  CMP - blood sugar, electrolytes, kidney function, liver enzymes and protein levels are normal    LIPID - cholesterol levels look healthy    TSH - thyroid is functioning normally    CBC - blood counts are all stable    So far the plan is to follow-up in 1 year, earlier if any new concerns

## 2023-08-29 ENCOUNTER — TELEPHONE (OUTPATIENT)
Dept: FAMILY MEDICINE CLINIC | Facility: CLINIC | Age: 53
End: 2023-08-29

## 2023-09-07 ENCOUNTER — TELEPHONE (OUTPATIENT)
Dept: INTERNAL MEDICINE CLINIC | Facility: CLINIC | Age: 53
End: 2023-09-07

## 2023-09-13 RX ORDER — LIRAGLUTIDE 6 MG/ML
INJECTION, SOLUTION SUBCUTANEOUS
Qty: 15 ML | Refills: 1 | Status: SHIPPED | OUTPATIENT
Start: 2023-09-13 | End: 2023-11-10

## 2023-09-13 RX ORDER — PEN NEEDLE, DIABETIC 30 GX3/16"
1 NEEDLE, DISPOSABLE MISCELLANEOUS DAILY
Qty: 90 EACH | Refills: 0 | Status: SHIPPED | OUTPATIENT
Start: 2023-09-13 | End: 2023-12-12

## 2023-10-12 DIAGNOSIS — F51.04 CHRONIC INSOMNIA: ICD-10-CM

## 2023-10-12 NOTE — TELEPHONE ENCOUNTER
LOV: 8/24/23 for annual physical    TRAZODONE 100 MG Oral Tab  TAKE 1 TABLET BY MOUTH EVERY DAY AT NIGHT Dispense: 90 tablet, Refills: 0 ordered       07/15/2023     Future Appointments   Date Time Provider Antonia Keene   1/8/2024  5:00 PM [facility]

## 2023-10-13 RX ORDER — TRAZODONE HYDROCHLORIDE 100 MG/1
100 TABLET ORAL NIGHTLY
Qty: 90 TABLET | Refills: 0 | Status: SHIPPED | OUTPATIENT
Start: 2023-10-13

## 2023-10-26 ENCOUNTER — TELEPHONE (OUTPATIENT)
Dept: INTERNAL MEDICINE CLINIC | Facility: CLINIC | Age: 53
End: 2023-10-26

## 2023-10-26 NOTE — TELEPHONE ENCOUNTER
Ceci Samano sent the 2 generic components of Contrave to the patients pharmacy on 10/23/23  I left her message even if they are not covered - should be cheap. I asked her to call back.

## 2023-10-26 NOTE — TELEPHONE ENCOUNTER
Pt called in regards to her meds not being covered  by ins. Pt states pharmacy does not have enough info to refill an rx.

## 2023-10-27 NOTE — TELEPHONE ENCOUNTER
I called patient as she left another message today. She did get her naltrexone and bupropion and will [facility] Contrave brand.

## 2023-11-05 DIAGNOSIS — G47.61 PLMD (PERIODIC LIMB MOVEMENT DISORDER): ICD-10-CM

## 2023-11-07 NOTE — TELEPHONE ENCOUNTER
Routing to provider per protocol. ROPINIROLE 0.5 MG Oral Tab   Last refilled on 8/8/23 for #90  with 0 rf. Last labs 8/24/23. Last seen on 8/24/23. Future Appointments   Date Time Provider Antonia Keene   1/8/2024  5:00 PM [facility]          Thank you.

## 2023-11-09 ENCOUNTER — TELEPHONE (OUTPATIENT)
Dept: FAMILY MEDICINE CLINIC | Facility: CLINIC | Age: 53
End: 2023-11-09

## 2023-11-09 NOTE — TELEPHONE ENCOUNTER
Please follow-up, what are the results of covid test?    If negative, continue supportive care. If positive, we would need vitals to determine if antiviral is appropriate. I have a 4:20 appointment available.

## 2023-11-09 NOTE — TELEPHONE ENCOUNTER
Pt reports went to [facility] with sister and mom  Just got back early yesterday    All of them felt sinus pressure the whole time there, 5 days - thought was the hot weather, climate change    Towards end of trip - mom started coughing - tested positive Covid yesterday    Pt reports close contact with mom    Now pt's back of throat is sore - started yesterday  Sinus pressure improved  Dry cough  Nasal congestion   Uses nasal spray    Pt has not tested for covid at this time  Advised pt to covid test if able -she v/u    Please advise, thank you    (LOV 08/24/23 w/ Victor Hugo VALLE)

## 2023-11-09 NOTE — TELEPHONE ENCOUNTER
Spoke with patient who states her covid test was negative   Will continue to monitor at home and treat with OTC  States she will take another test in a day or two and let [address] know if any further concerns      Sharmila MARTÍNEZ

## 2023-11-10 RX ORDER — ROPINIROLE 0.5 MG/1
0.5 TABLET, FILM COATED ORAL EVERY EVENING
Qty: 90 TABLET | Refills: 0 | Status: SHIPPED | OUTPATIENT
Start: 2023-11-10

## 2023-11-11 ENCOUNTER — TELEPHONE (OUTPATIENT)
Dept: FAMILY MEDICINE CLINIC | Facility: CLINIC | Age: 53
End: 2023-11-11

## 2023-11-11 ENCOUNTER — OFFICE VISIT (OUTPATIENT)
Dept: FAMILY MEDICINE CLINIC | Facility: CLINIC | Age: 53
End: 2023-11-11
Payer: COMMERCIAL

## 2023-11-11 VITALS
TEMPERATURE: 97 F | BODY MASS INDEX: 51 KG/M2 | OXYGEN SATURATION: 98 % | RESPIRATION RATE: 22 BRPM | WEIGHT: 293 LBS | HEART RATE: 86 BPM | DIASTOLIC BLOOD PRESSURE: 64 MMHG | SYSTOLIC BLOOD PRESSURE: 128 MMHG

## 2023-11-11 DIAGNOSIS — J06.9 VIRAL URI WITH COUGH: Primary | ICD-10-CM

## 2023-11-11 PROCEDURE — 99213 OFFICE O/P EST LOW 20 MIN: CPT | Performed by: FAMILY MEDICINE

## 2023-11-11 PROCEDURE — 3078F DIAST BP <80 MM HG: CPT | Performed by: FAMILY MEDICINE

## 2023-11-11 PROCEDURE — 3074F SYST BP LT 130 MM HG: CPT | Performed by: FAMILY MEDICINE

## 2023-11-11 RX ORDER — BENZONATATE 200 MG/1
200 CAPSULE ORAL 3 TIMES DAILY PRN
Qty: 30 CAPSULE | Refills: 0 | Status: SHIPPED | OUTPATIENT
Start: 2023-11-11

## 2023-11-11 NOTE — TELEPHONE ENCOUNTER
Pt called she went on vacation last week was feeling sick and also her mom, mom is covid + as of Thursday when she got her tests results.      Pt's test results came back negative for covid     Pt feels ear pressure, around neck is swollen, coughing some phlegms (yellowish), sore throat, no fever, feels very tired     Pt call back # [phone]    Would like to be seen in office is possible,    Thank you,

## 2023-11-13 ENCOUNTER — TELEPHONE (OUTPATIENT)
Dept: FAMILY MEDICINE CLINIC | Facility: CLINIC | Age: 53
End: 2023-11-13

## 2023-11-13 NOTE — TELEPHONE ENCOUNTER
Patient notified and verbalized understanding. Patient asking if she should take paxlovid. Discussed paxlovid not effective unless started within 5 days of syptoms. Also advised symptoms already improving she likely does not need to. Patient states she started feeling sick on Thursday. Would like recs regarding paxlovid and when she can return to work.     Routed to  to advise

## 2023-11-13 NOTE — TELEPHONE ENCOUNTER
Pt was seen on Saturday, she tested positive for covid last night, she has yellow mucous which started last night and some on Friday. Pt said she is feeling much better.

## 2023-11-13 NOTE — TELEPHONE ENCOUNTER
Patient notified and verbalized understanding. States she does not need note for work. Her boss told her to stay home all week.

## 2023-11-13 NOTE — TELEPHONE ENCOUNTER
I agree, if getting better and already on Day #4-5, then I do not recommend paxlovid. It's not worth it. She may return to work wearing a mask for the first five days starting Wednesday.

## 2024-01-10 ENCOUNTER — TELEPHONE (OUTPATIENT)
Dept: FAMILY MEDICINE CLINIC | Facility: CLINIC | Age: 54
End: 2024-01-10

## 2024-01-10 ENCOUNTER — OFFICE VISIT (OUTPATIENT)
Dept: FAMILY MEDICINE CLINIC | Facility: CLINIC | Age: 54
End: 2024-01-10
Payer: COMMERCIAL

## 2024-01-10 VITALS
BODY MASS INDEX: 53 KG/M2 | WEIGHT: 293 LBS | DIASTOLIC BLOOD PRESSURE: 70 MMHG | HEART RATE: 87 BPM | SYSTOLIC BLOOD PRESSURE: 134 MMHG | OXYGEN SATURATION: 99 % | RESPIRATION RATE: 22 BRPM | TEMPERATURE: 98 F

## 2024-01-10 DIAGNOSIS — R07.9 CHEST PAIN, UNSPECIFIED TYPE: Primary | ICD-10-CM

## 2024-01-10 DIAGNOSIS — R06.09 DYSPNEA ON EXERTION: ICD-10-CM

## 2024-01-10 LAB
ATRIAL RATE: 69 BPM
P AXIS: 51 DEGREES
P-R INTERVAL: 162 MS
Q-T INTERVAL: 374 MS
QRS DURATION: 80 MS
QTC CALCULATION (BEZET): 400 MS
R AXIS: 46 DEGREES
T AXIS: 72 DEGREES
VENTRICULAR RATE: 69 BPM

## 2024-01-10 PROCEDURE — 99214 OFFICE O/P EST MOD 30 MIN: CPT | Performed by: NURSE PRACTITIONER

## 2024-01-10 PROCEDURE — 3075F SYST BP GE 130 - 139MM HG: CPT | Performed by: NURSE PRACTITIONER

## 2024-01-10 PROCEDURE — 93000 ELECTROCARDIOGRAM COMPLETE: CPT | Performed by: NURSE PRACTITIONER

## 2024-01-10 PROCEDURE — 3078F DIAST BP <80 MM HG: CPT | Performed by: NURSE PRACTITIONER

## 2024-01-10 NOTE — TELEPHONE ENCOUNTER
Patient's name and  verified  Pain location is under breast in the middle breast.  The pain is not sharp but not dull, 4/10,  heavy breathier. Patient is out of breath walking into work.  Scheduled patient     Future Appointments   Date Time Provider Department Center   1/10/2024 10:20 AM Lizeth Gill APRN [facility] [place]       Patient notified and verbalized an understanding
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
## 2024-01-10 NOTE — TELEPHONE ENCOUNTER
PATIENT CALLING THIS MORNING. SHE SAYS SHE IS HAVING A PAIN UNDER BREAST FOR AROUND 6 MONTHS NOW. SHE SAYS SHE IS A HEAVY BREATHER AND FEELS HERSELF BREATHING HEAVIER EVEN WHEN SITTING DOWN. I RECOMMENDED TO HAVE HER TRIAGED BY ONE OF OUR NURSE. SHE SAYS SHE ALWAYS BREATHES HEAVY.

## 2024-01-20 DIAGNOSIS — F51.04 CHRONIC INSOMNIA: ICD-10-CM

## 2024-01-20 RX ORDER — TRAZODONE HYDROCHLORIDE 100 MG/1
100 TABLET ORAL NIGHTLY
Qty: 90 TABLET | Refills: 0 | Status: SHIPPED | OUTPATIENT
Start: 2024-01-20

## 2024-01-20 NOTE — TELEPHONE ENCOUNTER
No refill protocol for this medication.    Last refill: 10/13/2023 #90 with 0 refills  Last Visit: 1/10/2024   Next Visit: No future appointments.      Forward to Dr. Ray please advise on refills. Thanks.

## 2024-01-22 RX ORDER — BUPROPION HYDROCHLORIDE 150 MG/1
150 TABLET ORAL DAILY
Qty: 90 TABLET | Refills: 0 | OUTPATIENT
Start: 2024-01-22

## 2024-01-22 NOTE — TELEPHONE ENCOUNTER
Requesting   Requested Prescriptions     Pending Prescriptions Disp Refills    BUPROPION  MG Oral Tablet 24 Hr [Pharmacy Med Name: BUPROPION HCL  MG TABLET] 90 tablet 0     Sig: TAKE 1 TABLET BY MOUTH EVERY DAY     LOV: 8/22/23  RTC: not noted  Filled: 10/23/23 #30 with 2 refills    No future appointments.  Needs appt

## 2024-02-05 DIAGNOSIS — G47.61 PLMD (PERIODIC LIMB MOVEMENT DISORDER): ICD-10-CM

## 2024-02-06 RX ORDER — ROPINIROLE 0.5 MG/1
0.5 TABLET, FILM COATED ORAL EVERY EVENING
Qty: 90 TABLET | Refills: 0 | Status: SHIPPED | OUTPATIENT
Start: 2024-02-06

## 2024-02-06 NOTE — TELEPHONE ENCOUNTER
No refill protocol for this medication.    Last refill: 11/10/2023 #90 with 0 refills  Last Visit: 1/10/2024 with HANNA  Next Visit: No future appointments.      Forward to TORI Kaur please advise on refills. Thanks.

## 2024-02-12 ENCOUNTER — TELEPHONE (OUTPATIENT)
Dept: FAMILY MEDICINE CLINIC | Facility: CLINIC | Age: 54
End: 2024-02-12

## 2024-02-12 NOTE — TELEPHONE ENCOUNTER
Lab Frequency Next Occurrence   CT CALCIUM SCORING Once 01/10/2024     Letter mailed to patient reminding them they have outstanding orders.

## 2024-03-08 ENCOUNTER — TELEMEDICINE (OUTPATIENT)
Dept: FAMILY MEDICINE CLINIC | Facility: CLINIC | Age: 54
End: 2024-03-08
Payer: COMMERCIAL

## 2024-03-08 DIAGNOSIS — J01.80 ACUTE NON-RECURRENT SINUSITIS OF OTHER SINUS: Primary | ICD-10-CM

## 2024-03-08 DIAGNOSIS — H93.8X3 SENSATION OF FULLNESS IN BOTH EARS: ICD-10-CM

## 2024-03-08 PROCEDURE — 99213 OFFICE O/P EST LOW 20 MIN: CPT | Performed by: NURSE PRACTITIONER

## 2024-03-08 RX ORDER — DOXYCYCLINE 100 MG/1
100 TABLET ORAL 2 TIMES DAILY
Qty: 20 TABLET | Refills: 0 | Status: SHIPPED | OUTPATIENT
Start: 2024-03-08 | End: 2024-03-18
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
## 2024-05-07 DIAGNOSIS — G47.61 PLMD (PERIODIC LIMB MOVEMENT DISORDER): ICD-10-CM

## 2024-05-07 RX ORDER — ROPINIROLE 0.5 MG/1
0.5 TABLET, FILM COATED ORAL NIGHTLY
Qty: 90 TABLET | Refills: 0 | Status: SHIPPED | OUTPATIENT
Start: 2024-05-07

## 2024-07-15 ENCOUNTER — OFFICE VISIT (OUTPATIENT)
Dept: FAMILY MEDICINE CLINIC | Facility: CLINIC | Age: 54
End: 2024-07-15
Payer: COMMERCIAL

## 2024-07-15 VITALS
HEIGHT: 64 IN | SYSTOLIC BLOOD PRESSURE: 138 MMHG | RESPIRATION RATE: 16 BRPM | TEMPERATURE: 98 F | HEART RATE: 78 BPM | DIASTOLIC BLOOD PRESSURE: 80 MMHG | BODY MASS INDEX: 50.02 KG/M2 | OXYGEN SATURATION: 98 % | WEIGHT: 293 LBS

## 2024-07-15 DIAGNOSIS — M54.50 ACUTE LEFT-SIDED LOW BACK PAIN WITHOUT SCIATICA: Primary | ICD-10-CM

## 2024-07-15 DIAGNOSIS — Z48.02 ENCOUNTER FOR REMOVAL OF SUTURES: ICD-10-CM

## 2024-07-15 DIAGNOSIS — M62.89 MUSCLE TIGHTNESS: ICD-10-CM

## 2024-07-15 PROCEDURE — 99024 POSTOP FOLLOW-UP VISIT: CPT | Performed by: NURSE PRACTITIONER

## 2024-07-15 PROCEDURE — 99213 OFFICE O/P EST LOW 20 MIN: CPT | Performed by: NURSE PRACTITIONER

## 2024-07-15 RX ORDER — TIZANIDINE 2 MG/1
2 TABLET ORAL 3 TIMES DAILY PRN
COMMUNITY
End: 2024-07-15

## 2024-07-15 RX ORDER — HYDROCODONE BITARTRATE AND ACETAMINOPHEN 5; 325 MG/1; MG/1
1-2 TABLET ORAL EVERY 6 HOURS PRN
COMMUNITY

## 2024-07-15 RX ORDER — CYCLOBENZAPRINE HCL 10 MG
10 TABLET ORAL NIGHTLY PRN
Qty: 10 TABLET | Refills: 0 | Status: SHIPPED | OUTPATIENT
Start: 2024-07-15

## 2024-07-15 RX ORDER — DIAZEPAM 5 MG/1
5 TABLET ORAL EVERY 8 HOURS PRN
COMMUNITY
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
## 2024-07-30 ENCOUNTER — TELEPHONE (OUTPATIENT)
Dept: INTERNAL MEDICINE CLINIC | Facility: CLINIC | Age: 54
End: 2024-07-30

## 2024-07-30 DIAGNOSIS — E53.8 LOW SERUM VITAMIN B12: ICD-10-CM

## 2024-07-30 DIAGNOSIS — E66.01 CLASS 3 SEVERE OBESITY WITH BODY MASS INDEX (BMI) OF 50.0 TO 59.9 IN ADULT, UNSPECIFIED OBESITY TYPE, UNSPECIFIED WHETHER SERIOUS COMORBIDITY PRESENT (HCC): Primary | ICD-10-CM

## 2024-07-30 DIAGNOSIS — G47.33 OSA ON CPAP: ICD-10-CM

## 2024-07-30 NOTE — TELEPHONE ENCOUNTER
Future Appointments   Date Time Provider Department Center   8/21/2024  4:00 PM Haider Riddle, [facility]     Patient has initial consult with Hadier Riddle on 8/21/24. Patient states she was referred by Teresa Vega.  Please place referral for upcoming appointment.

## 2024-08-21 ENCOUNTER — TELEPHONE (OUTPATIENT)
Dept: PHYSICAL THERAPY | Facility: HOSPITAL | Age: 54
End: 2024-08-21

## 2024-08-21 ENCOUNTER — OFFICE VISIT (OUTPATIENT)
Dept: INTERNAL MEDICINE CLINIC | Facility: CLINIC | Age: 54
End: 2024-08-21
Payer: COMMERCIAL

## 2024-08-21 VITALS — WEIGHT: 293 LBS | BODY MASS INDEX: 53 KG/M2

## 2024-08-21 DIAGNOSIS — G47.33 OSA ON CPAP: ICD-10-CM

## 2024-08-21 DIAGNOSIS — E53.8 LOW SERUM VITAMIN B12: ICD-10-CM

## 2024-08-21 DIAGNOSIS — E66.01 CLASS 3 SEVERE OBESITY WITH BODY MASS INDEX (BMI) OF 50.0 TO 59.9 IN ADULT, UNSPECIFIED OBESITY TYPE, UNSPECIFIED WHETHER SERIOUS COMORBIDITY PRESENT (HCC): Primary | ICD-10-CM

## 2024-08-21 PROCEDURE — 97802 MEDICAL NUTRITION INDIV IN: CPT | Performed by: DIETITIAN, REGISTERED

## 2024-08-22 NOTE — PROGRESS NOTES
INITIAL OUTPATIENT NUTRITION CONSULTATION  Nutrition Assessment    Medical Diagnosis: Obesity and PHYLLIS    Physical Findings: fatigue    Client Age and Gender: 54 year old female    Pertinent social hx:, works in accounts payable.  [employer] 2 days/week, 3 days in office      Labs:   No components found for: \"HGBA1C\"  TRIGLYCERIDES   Date Value Ref Range Status   04/07/2022 103 <150 mg/dL Final     Triglycerides   Date Value Ref Range Status   08/24/2023 98 30 - 149 mg/dL Final     Comment:     Reference interval for fasting triglycerides  Desirable: <150 mg/dL  Borderline: 150-199 mg/dL  High: 200-499 mg/dL  Very High: >=500 mg/dL           LDL Cholesterol   Date Value Ref Range Status   08/24/2023 75 <100 mg/dL Final     Comment:     Desirable <100 mg/dL   Borderline 100-129 mg/dL   High     >=130mg/dL         LDL-CHOLESTEROL   Date Value Ref Range Status   04/07/2022 60 mg/dL (calc) Final     Comment:     Reference range: <100     Desirable range <100 mg/dL for primary prevention;    <70 mg/dL for patients with CHD or diabetic patients   with > or = 2 CHD risk factors.     LDL-C is now calculated using the Rafy-Gregory   calculation, which is a validated novel method providing   better accuracy than the Friedewald equation in the   estimation of LDL-C.   Rafy SS et al. XOCHILT. 2013;310(19): 2920-9354   (http://education.[employer].com/faq/XOJ123)       HDL Cholesterol   Date Value Ref Range Status   08/24/2023 64 (H) 40 - 59 mg/dL Final     Comment:     Interpretive Information:   An HDL cholesterol <40 mg/dL is low and constitutes a coronary heart disease risk factor. An HDL cholesterol >60 mg/dL is a negative risk factor for coronary heart disease.         HDL CHOLESTEROL   Date Value Ref Range Status   04/07/2022 69 > OR = 50 mg/dL Final     AST   Date Value Ref Range Status   08/24/2023 15 15 - 37 U/L Final   05/07/2022 13 10 - 35 U/L Final     ALT   Date Value Ref Range Status   08/24/2023 34 13 -  56 U/L Final   05/07/2022 18 6 - 29 U/L Final         Height:  Ht Readings from Last 1 Encounters:   07/15/24 5' 4\" (1.626 m)       Weight:   Wt Readings from Last 2 Encounters:   08/21/24 (!) 309 lb (140.2 kg)   07/15/24 (!) 314 lb (142.4 kg)       BMI Readings from Last 1 Encounters:   08/21/24 53.04 kg/m²       Weight change: Decrease of 5 lbs in the past month    Diet/Weight History: Max weight of 314 lbs at first appt July 2024.  Lowest weight of 110 lbs per pt report 10 years ago. Goal weight of 130 lbs.  Pt lost weight with rigid eating and exercise routine.  Logged food and for every calorie she went over calorie goal pt would do one push up or walk the dog one mile    Current Diet: High in refined carbs from candy, soda, desserts, pasta and breads.  Eats excessive potatoes.  Diet lacks fruits, vegetables and whole grains.    Food/Beverage Intake: oral recall  Breakfast: Egg, chocolate protein shake, energy drink, Coke  Lunch: PB sandwich, chips, coke  Dinner: Meat, bread, potatoes, vegetable.  Yesterday ate at 9:00 picked up dinner from [email or web address]  Snacks: Candy bars, Twizzlers, sweets  Beverages: 2 cans Coke, Orange crush flavor packets, Celsius or Rockstar energy drinks    Meal pattern: 3 meals/d, excess snacks/d    Number of meals/week eaten at restaurants: 1-2        Alcohol Intake: 0-1 drinks/wk, Pina Colada    Estimated caloric needs for weight loss: 2000 cals/d for 1.5 pounds/week weight loss    Physical Activity: 0 hrs/week     Food Journal: not currently.  Hx of using    Spent 60 minutes in consultation with the patient.      Nutrition Intervention/Education:  Comprehensive nutrition education and evaluation provided for weight loss. PT with very high sugar intake and associated fatigue.  Discussed connection between high sugar intake and fatigue.  Concept of raising sweet threshold with sweet beverages was reviewed.  Reducing sugar intake and improving sleep habits to get 7-8 hours of sleep  nightly was recommended.  Set goal of reducing soda.  Provided high fiber and protein pasta reccs (pt does not like legumes) of whole wheat pasta or protein enriched pasta.  Pt asking about portions on starches which were demonstrated.  Butter substitutes were recommended. Patient agreed to goals below.    Goals:   Limit Coke to one can per week  Increase sleep time to 7-8 hours nightly  Reduce portions on pasta, potatoes, bread  Reduce candy/sugar intake  Opt for nutrient dense snacks    Monitoring/Evaluation:  Patient encouraged to schedule follow up appt . Information was provided to verify insurance coverage for MNT        Haider Riddle MS, RD, LDN

## 2024-08-23 ENCOUNTER — APPOINTMENT (OUTPATIENT)
Dept: PHYSICAL THERAPY | Age: 54
End: 2024-08-23
Attending: NURSE PRACTITIONER
Payer: COMMERCIAL

## 2024-08-27 ENCOUNTER — TELEPHONE (OUTPATIENT)
Dept: PHYSICAL THERAPY | Facility: HOSPITAL | Age: 54
End: 2024-08-27

## 2024-08-30 ENCOUNTER — OFFICE VISIT (OUTPATIENT)
Dept: PHYSICAL THERAPY | Age: 54
End: 2024-08-30
Attending: NURSE PRACTITIONER
Payer: COMMERCIAL

## 2024-08-30 DIAGNOSIS — M79.641 RIGHT HAND PAIN: Primary | ICD-10-CM

## 2024-08-30 PROCEDURE — 97161 PT EVAL LOW COMPLEX 20 MIN: CPT
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
## 2024-09-03 ENCOUNTER — TELEPHONE (OUTPATIENT)
Dept: FAMILY MEDICINE CLINIC | Facility: CLINIC | Age: 54
End: 2024-09-03

## 2024-09-03 ENCOUNTER — PATIENT MESSAGE (OUTPATIENT)
Dept: FAMILY MEDICINE CLINIC | Facility: CLINIC | Age: 54
End: 2024-09-03

## 2024-09-03 ENCOUNTER — TELEPHONE (OUTPATIENT)
Dept: PHYSICAL THERAPY | Age: 54
End: 2024-09-03

## 2024-09-03 ENCOUNTER — OFFICE VISIT (OUTPATIENT)
Dept: PHYSICAL THERAPY | Age: 54
End: 2024-09-03
Attending: NURSE PRACTITIONER
Payer: COMMERCIAL

## 2024-09-03 DIAGNOSIS — M25.562 ACUTE PAIN OF BOTH KNEES: Primary | ICD-10-CM

## 2024-09-03 DIAGNOSIS — Z12.31 ENCOUNTER FOR SCREENING MAMMOGRAM FOR MALIGNANT NEOPLASM OF BREAST: ICD-10-CM

## 2024-09-03 DIAGNOSIS — M79.641 RIGHT HAND PAIN: ICD-10-CM

## 2024-09-03 DIAGNOSIS — Z12.11 SCREEN FOR COLON CANCER: Primary | ICD-10-CM

## 2024-09-03 DIAGNOSIS — M25.561 ACUTE PAIN OF BOTH KNEES: Primary | ICD-10-CM

## 2024-09-03 DIAGNOSIS — M79.672 BILATERAL FOOT PAIN: ICD-10-CM

## 2024-09-03 DIAGNOSIS — M79.671 BILATERAL FOOT PAIN: ICD-10-CM

## 2024-09-03 PROCEDURE — 97140 MANUAL THERAPY 1/> REGIONS: CPT

## 2024-09-03 PROCEDURE — 97110 THERAPEUTIC EXERCISES: CPT

## 2024-09-03 RX ORDER — METHYLPREDNISOLONE 4 MG
TABLET, DOSE PACK ORAL
Qty: 21 TABLET | Refills: 0 | Status: SHIPPED | OUTPATIENT
Start: 2024-09-03

## 2024-09-03 NOTE — TELEPHONE ENCOUNTER
Poison ivy    Arms  Both sides (torso)  Legs    X 2 days    Patient has prescription lotion but it is not helping much due to the amount of rash    She is leaving on vacation soon    Requesting Rx    Please adv  Thank you

## 2024-09-03 NOTE — PROGRESS NOTES
Date of service: 09/03/2024  Dx: Acute pain of both knees (M25.561,M25.562), Bilateral foot pain (M79.671,M79.672)       Insurance: [employer] CHOICE/HMO/POS/EPO  Insurance Limits: no limit, no auth  Visit #: 2  Authorized # of Visits: 8 recommended  POC/Auth Expiration: N/A  Authorizing Physician/Provider: Jairo   Insurance Primary/Secondary: [employer] / N/A       # Auth Visits: N/A            Subjective: Patient reports some inconsistent pain in her back since last visit, had pain yesterday but not as severe in the back as it is today.   Pain: 7/10 soreness/stiffness       Objective:       Assessment: Patient care this day focused on assessing tissue mobility of entire RLE, and lower leg of left LE, then addressing tension areas with cupping and stretching. Patient had limited tolerance to standing toe raises vs heel raises on right leg, but improved after following up with anterior tib stretch in standing. Patient would benefit from assessing response to new approach for BLE, and update HEP at that time.      Goals:   The patient will improve LE strength and endurance to facilitate walking distances of 2 mile(s) daily for household and community ambulation. Timeframe: 6 visits.  Patient will improve lower motor control to perform double-leg squat with symmetrical loading, without asymmetries, and with proper posterior chain loading to facilitate squatting and lifting ADL's. Timeframe: 8 visits.  Patient will improve LE mobility, strength, and single-leg stabilization to meet strength requirements for reciprocal stair navigation pattern with no asymmetries for ascending and descending 5 flights of stairs daily for  community integration. Timeframe: 8 visits.  Patient will improve LEFS score by at least 9 points to indicate a true change in improved function for ADL's and restoring PLF. Timeframe: 8 visits.    Plan: Progress LE strength bilaterally as tolerated, stretching and strengthening for  hips as well.  Date: 09/03/2024  TX#: 2/8 Date:                 TX#: 3/ Date:                 TX#: 4/ Date:                 TX#: 5/ Date:   Tx#: 6/   Ther Ex: 25'  Modified H/L Hip Add Stretch 2 x 20\" (L)   H/L Gluteal Mob w Ball (L) x 10   Seated Anterior Tib/Extensor Digitorum Longus Stretch 2 x 20\" ea R/L   Seated Piriformis Stretch 2 x 20\" ea R/L (Alternate to avoid cramping)  Standing Gastroc Stretch 2 x 20\" ea R/L   Standing Soleus Stretch 2 x 20\" ea R/L   Standing HR x 10  Standing TR x 10   Standing Anterior Tib Stretch 2 x 20\" (R)  Seated Lumbar Flexion x 5 10\"        Manual: 15'   STM: Anterior Tib (B) HS (R), Calf (R)                      HEP: New will be provided next visit as prescription has changed    Charges: Manual x 1, Ther Ex x 2         Total Timed Treatment: 40 min    Total Treatment Time: 40 min

## 2024-09-03 NOTE — TELEPHONE ENCOUNTER
Physical therapy states Alize complains of right hand, bilateral foot and bilateral knee pain.  Would like to know if patient needs office visit.  PT orders placed, patient to follow-up if failure to improve or worsening.

## 2024-09-03 NOTE — TELEPHONE ENCOUNTER
Patient's name and  verified     Patient stated she is due for mammogram and colonoscopy   Last ajith- 2023    Referrals pended, sign if appropriate     Please Advise

## 2024-09-09 NOTE — PROGRESS NOTES
Date of service: 09/10/2024  Dx: Acute pain of both knees (M25.561,M25.562), Bilateral foot pain (M79.671,M79.672)       Insurance: [employer] CHOICE/HMO/POS/EPO  Insurance Limits: no limit, no auth  Visit #: 3  Authorized # of Visits: 8 recommended  POC/Auth Expiration: N/A  Authorizing Physician/Provider: Jairo   Insurance Primary/Secondary: [employer] / N/A       # Auth Visits: N/A            Subjective: Patient reports some inconsistent pain in her back since last visit, had pain yesterday but not as severe in the back as it is today.   Pain: 5/10       Objective:       Assessment: Patient presented to PT with some improvement in symptoms, reporting 5/10 compared to 7/10. Continued approach in previous visit and provided patient with updated HEP this day to reflect. Plan to assess response at next visit to changes made to today's visit as well as HEP provided.     Goals:   The patient will improve LE strength and endurance to facilitate walking distances of 2 mile(s) daily for household and community ambulation. Timeframe: 6 visits.  Patient will improve lower motor control to perform double-leg squat with symmetrical loading, without asymmetries, and with proper posterior chain loading to facilitate squatting and lifting ADL's. Timeframe: 8 visits.  Patient will improve LE mobility, strength, and single-leg stabilization to meet strength requirements for reciprocal stair navigation pattern with no asymmetries for ascending and descending 5 flights of stairs daily for  community integration. Timeframe: 8 visits.  Patient will improve LEFS score by at least 9 points to indicate a true change in improved function for ADL's and restoring PLF. Timeframe: 8 visits.    Plan: Progress LE strength bilaterally as tolerated, stretching and strengthening for hips as well.  Date: 09/03/2024  TX#: 2/8 Date: 09/10/2024          TX#: 3/8 Date:                 TX#: 4/ Date:                 TX#: 5/ Date:    Tx#: 6/   Ther Ex: 25'  Modified H/L Hip Add Stretch 2 x 20\" (L)   H/L Gluteal Mob w Ball (L) x 10   Seated Anterior Tib/Extensor Digitorum Longus Stretch 2 x 20\" ea R/L   Seated Piriformis Stretch 2 x 20\" ea R/L (Alternate to avoid cramping)  Standing Gastroc Stretch 2 x 20\" ea R/L   Standing Soleus Stretch 2 x 20\" ea R/L   Standing HR x 10  Standing TR x 10   Standing Anterior Tib Stretch 2 x 20\" (R)  Seated Lumbar Flexion x 5 10\"  Ther Ex: 15'  Added H/L Submax x 10 5\" H   Seated Anterior Tib/Extensor Digitorum Longus Stretch 2 x 20\" ea R/L   Standing HR x 10  Standing TR x 10           Manual: 15'   STM: Anterior Tib (B) HS (R), Calf (R)  Manual: 23'  Cupping: Anterior Tib (B) 6'  Roll Out Adductors (B) with Cane   STM: Adductors, Quads, Surrounding Patella                     HEP: New will be provided next visit as prescription has changed  Access Code: [email or web address]  URL: [email or web address]  Date: 09/10/2024  Prepared by: Sabrina Lynne  Exercises  - Seated Anterior Tibialis Stretch  - 2-3 x daily - 7 x weekly - 3 sets - 20s hold  - Seated Ankle PF with Inversion, Extensor Digitorum Stretch   - 2-3 x daily - 7 x weekly - 3 sets - 20s hold  - Gastroc Stretch on Wall  - 2-3 x daily - 7 x weekly - 3 sets - 20s hold  - Heel Raises with Counter Support  - 2 x daily - 7 x weekly - 1 sets - 10 reps  - Heel Toe Raises with Counter Support  - 2 x daily - 7 x weekly - 1 sets - 10 reps    Charges: Manual x 2, Ther Ex x 1         Total Timed Treatment: 38 min    Total Treatment Time: 38 min

## 2024-09-10 ENCOUNTER — OFFICE VISIT (OUTPATIENT)
Dept: PHYSICAL THERAPY | Age: 54
End: 2024-09-10
Attending: NURSE PRACTITIONER
Payer: COMMERCIAL

## 2024-09-10 PROCEDURE — 97140 MANUAL THERAPY 1/> REGIONS: CPT

## 2024-09-10 PROCEDURE — 97110 THERAPEUTIC EXERCISES: CPT

## 2024-09-12 ENCOUNTER — APPOINTMENT (OUTPATIENT)
Dept: PHYSICAL THERAPY | Age: 54
End: 2024-09-12
Attending: NURSE PRACTITIONER
Payer: COMMERCIAL

## 2024-10-18 ENCOUNTER — TELEPHONE (OUTPATIENT)
Dept: FAMILY MEDICINE CLINIC | Facility: CLINIC | Age: 54
End: 2024-10-18

## 2024-10-18 NOTE — TELEPHONE ENCOUNTER
Patient to clinic requesting referral for cardiology.   Reports swelling in hand and lower extremities.  Occasional heart pain.    Discussed with Lizeth VALLE who states patient needs evaluation in ER  Patient notified and verbalized understanding.         Also requesting referral for colonoscopy and mammogram.  [facility] for [facility] provided

## 2024-10-21 ENCOUNTER — OFFICE VISIT (OUTPATIENT)
Dept: FAMILY MEDICINE CLINIC | Facility: CLINIC | Age: 54
End: 2024-10-21
Payer: COMMERCIAL

## 2024-10-21 VITALS
BODY MASS INDEX: 50.02 KG/M2 | HEIGHT: 64 IN | RESPIRATION RATE: 16 BRPM | OXYGEN SATURATION: 97 % | TEMPERATURE: 98 F | WEIGHT: 293 LBS | HEART RATE: 78 BPM | SYSTOLIC BLOOD PRESSURE: 130 MMHG | DIASTOLIC BLOOD PRESSURE: 60 MMHG

## 2024-10-21 DIAGNOSIS — I83.90 VARICOSE VEINS: ICD-10-CM

## 2024-10-21 DIAGNOSIS — R07.89 DISCOMFORT IN CHEST: ICD-10-CM

## 2024-10-21 DIAGNOSIS — G89.29 CHRONIC PAIN OF RIGHT THUMB: ICD-10-CM

## 2024-10-21 DIAGNOSIS — R53.83 OTHER FATIGUE: ICD-10-CM

## 2024-10-21 DIAGNOSIS — M25.461 PAIN AND SWELLING OF RIGHT KNEE: ICD-10-CM

## 2024-10-21 DIAGNOSIS — M25.561 PAIN AND SWELLING OF RIGHT KNEE: ICD-10-CM

## 2024-10-21 DIAGNOSIS — R06.02 SHORTNESS OF BREATH: Primary | ICD-10-CM

## 2024-10-21 DIAGNOSIS — M79.644 CHRONIC PAIN OF RIGHT THUMB: ICD-10-CM
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
## 2024-10-22 ENCOUNTER — TELEPHONE (OUTPATIENT)
Dept: FAMILY MEDICINE CLINIC | Facility: CLINIC | Age: 54
End: 2024-10-22

## 2024-10-22 NOTE — TELEPHONE ENCOUNTER
Chaparro Rowe MD  P Chaparro Rowe Nurse  Needs wellness visit            Previous Messages       ----- Message -----  From: Lizeth Gill APRN  Sent: 10/21/2024   3:37 PM CDT  To: Chaparro Rowe MD  Office visit 10/21/24 with Lizeth VALLE

## 2024-11-01 ENCOUNTER — HOSPITAL ENCOUNTER (OUTPATIENT)
Dept: GENERAL RADIOLOGY | Age: 54
Discharge: HOME | End: 2024-11-01
Attending: NURSE PRACTITIONER
Payer: COMMERCIAL

## 2024-11-01 DIAGNOSIS — M25.461 PAIN AND SWELLING OF RIGHT KNEE: ICD-10-CM

## 2024-11-01 DIAGNOSIS — M25.561 PAIN AND SWELLING OF RIGHT KNEE: ICD-10-CM

## 2024-11-01 PROCEDURE — 73564 X-RAY EXAM KNEE 4 OR MORE: CPT | Performed by: NURSE PRACTITIONER

## 2024-11-02 LAB
% SATURATION: 13 % (CALC) (ref 16–45)
ABSOLUTE BASOPHILS: 39 CELLS/UL (ref 0–200)
ABSOLUTE EOSINOPHILS: 187 CELLS/UL (ref 15–500)
ABSOLUTE LYMPHOCYTES: 2496 CELLS/UL (ref 850–3900)
ABSOLUTE MONOCYTES: 242 CELLS/UL (ref 200–950)
ABSOLUTE NEUTROPHILS: 4836 CELLS/UL (ref 1500–7800)
BASOPHILS: 0.5 %
EOSINOPHILS: 2.4 %
FERRITIN: 106 NG/ML (ref 16–232)
HEMATOCRIT: 43 % (ref 35–45)
HEMOGLOBIN: 13.9 G/DL (ref 11.7–15.5)
IRON BINDING CAPACITY: 336 MCG/DL (CALC) (ref 250–450)
IRON, TOTAL: 44 MCG/DL (ref 45–160)
LYMPHOCYTES: 32 %
MCH: 28.7 PG (ref 27–33)
MCHC: 32.3 G/DL (ref 32–36)
MCV: 88.8 FL (ref 80–100)
MONOCYTES: 3.1 %
MPV: 12.4 FL (ref 7.5–12.5)
NEUTROPHILS: 62 %
PLATELET COUNT: 210 THOUSAND/UL (ref 140–400)
RDW: 13.1 % (ref 11–15)
RED BLOOD CELL COUNT: 4.84 MILLION/UL (ref 3.8–5.1)
WHITE BLOOD CELL COUNT: 7.8 THOUSAND/UL (ref 3.8–10.8)

## 2024-11-04 ENCOUNTER — HOSPITAL ENCOUNTER (OUTPATIENT)
Dept: MAMMOGRAPHY | Age: 54
Discharge: HOME | End: 2024-11-04
Attending: FAMILY MEDICINE
Payer: COMMERCIAL

## 2024-11-04 DIAGNOSIS — Z12.31 ENCOUNTER FOR SCREENING MAMMOGRAM FOR MALIGNANT NEOPLASM OF BREAST: ICD-10-CM

## 2024-11-04 PROCEDURE — 77063 BREAST TOMOSYNTHESIS BI: CPT | Performed by: FAMILY MEDICINE

## 2024-11-04 PROCEDURE — 77067 SCR MAMMO BI INCL CAD: CPT | Performed by: FAMILY MEDICINE

## 2024-11-06 ENCOUNTER — TELEPHONE (OUTPATIENT)
Dept: FAMILY MEDICINE CLINIC | Facility: CLINIC | Age: 54
End: 2024-11-06

## 2024-11-06 NOTE — TELEPHONE ENCOUNTER
Advised patient of Dr. Ray's note below. Patient verbalized understanding.    Patient asking about lab results done 11/01/24 - has not heard back regarding these labs    Advised patient the results received, will notify Lizeth VALLE to review - she verbalized understanding. No further questions at this time    Please advise, thank you

## 2024-11-06 NOTE — TELEPHONE ENCOUNTER
----- Message from Chaparro Rowe sent at 11/5/2024  4:25 PM CST -----  Please inform pt of normal mammogram. Thank you

## 2024-12-05 ENCOUNTER — TELEPHONE (OUTPATIENT)
Dept: FAMILY MEDICINE CLINIC | Facility: CLINIC | Age: 54
End: 2024-12-05

## 2024-12-05 NOTE — TELEPHONE ENCOUNTER
Future Appointments   Date Time Provider Department Center   12/6/2024  9:00 AM Lizeth Gill APRN [facility]     (LOV 10/21/24 with Lizeth VALLE; patient has NOT seen Dr. Ray in office)    Per Dr. Ray:  Please offer patient annual physical appointment with Dr. Ray  If patient wishes to continue to see Lizeth VALLE, may need to change PCP to Dr. Gloria as he is supervising provider for Lizeth VALLE

## 2024-12-06 ENCOUNTER — OFFICE VISIT (OUTPATIENT)
Dept: FAMILY MEDICINE CLINIC | Facility: CLINIC | Age: 54
End: 2024-12-06
Payer: COMMERCIAL

## 2024-12-06 VITALS
HEART RATE: 83 BPM | TEMPERATURE: 98 F | OXYGEN SATURATION: 97 % | SYSTOLIC BLOOD PRESSURE: 120 MMHG | HEIGHT: 64 IN | RESPIRATION RATE: 16 BRPM | WEIGHT: 293 LBS | BODY MASS INDEX: 50.02 KG/M2 | DIASTOLIC BLOOD PRESSURE: 60 MMHG

## 2024-12-06 DIAGNOSIS — I83.90 VARICOSE VEINS: ICD-10-CM

## 2024-12-06 DIAGNOSIS — F43.21 ADJUSTMENT DISORDER WITH DEPRESSED MOOD: ICD-10-CM

## 2024-12-06 DIAGNOSIS — Z00.00 ANNUAL PHYSICAL EXAM: Primary | ICD-10-CM

## 2024-12-06 DIAGNOSIS — E53.8 LOW SERUM VITAMIN B12: ICD-10-CM

## 2024-12-06 DIAGNOSIS — G47.33 OSA (OBSTRUCTIVE SLEEP APNEA): ICD-10-CM

## 2024-12-06 DIAGNOSIS — M79.644 CHRONIC PAIN OF RIGHT THUMB: ICD-10-CM

## 2024-12-06 DIAGNOSIS — E61.1 IRON DEFICIENCY: ICD-10-CM

## 2024-12-06 DIAGNOSIS — M54.2 CHRONIC NECK AND BACK PAIN: ICD-10-CM

## 2024-12-06 DIAGNOSIS — M54.9 CHRONIC NECK AND BACK PAIN: ICD-10-CM

## 2024-12-06 DIAGNOSIS — E55.9 VITAMIN D DEFICIENCY: ICD-10-CM

## 2024-12-06 DIAGNOSIS — G89.29 CHRONIC PAIN OF RIGHT THUMB: ICD-10-CM

## 2024-12-06 DIAGNOSIS — F51.04 CHRONIC INSOMNIA: ICD-10-CM

## 2024-12-06 DIAGNOSIS — M25.561 PAIN AND SWELLING OF RIGHT KNEE: ICD-10-CM

## 2024-12-06 DIAGNOSIS — G89.29 CHRONIC NECK AND BACK PAIN: ICD-10-CM

## 2024-12-06 DIAGNOSIS — M25.461 PAIN AND SWELLING OF RIGHT KNEE: ICD-10-CM

## 2024-12-06 DIAGNOSIS — G47.61 PLMD (PERIODIC LIMB MOVEMENT DISORDER): ICD-10-CM

## 2024-12-06 DIAGNOSIS — R06.02 SHORTNESS OF BREATH: ICD-10-CM

## 2024-12-06 PROBLEM — G20.A1 PARKINSON'S DISEASE (HCC): Status: RESOLVED | Noted: 2023-02-20 | Resolved: 2024-12-06

## 2024-12-06 PROCEDURE — 99396 PREV VISIT EST AGE 40-64: CPT | Performed by: NURSE PRACTITIONER

## 2024-12-06 RX ORDER — FERROUS SULFATE 325(65) MG
TABLET ORAL
Qty: 90 TABLET | Refills: 0 | Status: SHIPPED | OUTPATIENT
Start: 2024-12-06
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
## 2024-12-10 ENCOUNTER — MED REC SCAN ONLY (OUTPATIENT)
Dept: FAMILY MEDICINE CLINIC | Facility: CLINIC | Age: 54
End: 2024-12-10

## 2024-12-28 NOTE — TELEPHONE ENCOUNTER
See Annual Physical appt 12/06/24 with Lizeth VALLE   [de-identified] : This 64 year old woman  has a stong FH of colon CA. The patient last underwent a colonoscopy over three years ago. The patient is asymptomatic regarding her GI tract

## 2024-12-31 ENCOUNTER — OFFICE VISIT (OUTPATIENT)
Dept: FAMILY MEDICINE CLINIC | Facility: CLINIC | Age: 54
End: 2024-12-31
Payer: COMMERCIAL

## 2024-12-31 VITALS
TEMPERATURE: 98 F | DIASTOLIC BLOOD PRESSURE: 75 MMHG | RESPIRATION RATE: 18 BRPM | OXYGEN SATURATION: 98 % | SYSTOLIC BLOOD PRESSURE: 131 MMHG | HEIGHT: 64 IN | BODY MASS INDEX: 50.02 KG/M2 | WEIGHT: 293 LBS | HEART RATE: 83 BPM

## 2024-12-31 DIAGNOSIS — J06.9 UPPER RESPIRATORY TRACT INFECTION, UNSPECIFIED TYPE: Primary | ICD-10-CM

## 2024-12-31 LAB
CONTROL LINE PRESENT WITH A CLEAR BACKGROUND (YES/NO): YES YES/NO
KIT LOT #: NORMAL NUMERIC
OPERATOR ID: NORMAL
POCT LOT NUMBER: NORMAL
RAPID SARS-COV-2 BY PCR: NOT DETECTED

## 2024-12-31 PROCEDURE — U0002 COVID-19 LAB TEST NON-CDC: HCPCS | Performed by: NURSE PRACTITIONER

## 2024-12-31 PROCEDURE — 87880 STREP A ASSAY W/OPTIC: CPT | Performed by: NURSE PRACTITIONER

## 2024-12-31 PROCEDURE — 99213 OFFICE O/P EST LOW 20 MIN: CPT | Performed by: NURSE PRACTITIONER
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
## 2025-01-24 ENCOUNTER — TELEPHONE (OUTPATIENT)
Dept: FAMILY MEDICINE CLINIC | Facility: CLINIC | Age: 55
End: 2025-01-24

## 2025-01-24 NOTE — TELEPHONE ENCOUNTER
[facility] [address] [place] [facility] [place], [place] - [address] [phone], [phone]   [address] [facility] [address]   Phone: [phone] Fax: [phone]   Hours: Not open 24 hours       PATIENT CALLING THIS MORNING.  PATIENT HAVING DIARRHEA AND THROWING UP ALL DAY STARTING YESTERDAY.  PATIENT IS TAKING LIQUID IV TO HELP.  PATIENT ASKING FOR ANY RECOMMENDATIONS.  PATIENT CANNOT COME IN THE OFFICE TODAY.

## 2025-01-24 NOTE — TELEPHONE ENCOUNTER
Patient notified and verbalized understanding.   States she has started drinking fluids and it is not causing any nausea.  Patient reports she is not having any dizziness.    Patient states she will continue to push fluids for now. If she is unable to tolerate or starts having any dizziness or other concerns will go to UC/ER

## 2025-01-24 NOTE — TELEPHONE ENCOUNTER
Spoke with patient who states she had vomiting and diarrhea yesterday.  Had abdominal bloating and abdominal pain yesterday.  States pain was so bad yesterday she almost went to hosptial.    Today pain is better. Rates pain 5 of 10. Pain located in upper abdomen  No vomiting today but has been up since 4 am with diarrhea.  Stool is water and dark brown. No blood in stool.  States she drank about 1/4 cup of IV water. Is afraid to drink anything.    States she was seen last month at [facility] and she has not felt good since then. Sinuses have continued to bother her.     Can not come to clinic today.    Advised patient to start taking small sips of water as she needs fluids to prevent dehydration.    Advised message to be forwarded to Dr Massey for further recommendations.

## 2025-02-09 NOTE — TELEPHONE ENCOUNTER
Future Appointments   Date Time Provider Antonia Keene   5/19/2021  1:30 PM Carrington Goodwin MD [facility] De [facility]   5/20/2021  5:30 PM Micheal Gannon [facility] [address]   5/27/2021  5:30 PM Micheal Gannon [facility] Pilar   6/3/2021  5:30 PM F
Please schedule VV this morning or afternoon
Pt thinks she has poison ivy. . She would like to know if [facility] can give her something. She has it on his skin and in Hair line.  On her legs, arms  Please return call to [phone]
No

## 2025-04-28 ENCOUNTER — TELEPHONE (OUTPATIENT)
Dept: FAMILY MEDICINE CLINIC | Facility: CLINIC | Age: 55
End: 2025-04-28

## 2025-04-28 NOTE — TELEPHONE ENCOUNTER
Pt calling- states Dr. Nation's office ([facility]) is to fax over an order for labs pt is to have done. Pt asking of order has been received?      Please advise.

## 2025-04-29 ENCOUNTER — OFFICE VISIT (OUTPATIENT)
Dept: FAMILY MEDICINE CLINIC | Facility: CLINIC | Age: 55
End: 2025-04-29
Payer: COMMERCIAL

## 2025-04-29 VITALS
HEIGHT: 64 IN | HEART RATE: 97 BPM | RESPIRATION RATE: 16 BRPM | WEIGHT: 293 LBS | BODY MASS INDEX: 50.02 KG/M2 | DIASTOLIC BLOOD PRESSURE: 60 MMHG | TEMPERATURE: 98 F | OXYGEN SATURATION: 97 % | SYSTOLIC BLOOD PRESSURE: 132 MMHG

## 2025-04-29 DIAGNOSIS — H35.61 RETINAL HEMORRHAGE OF RIGHT EYE: Primary | ICD-10-CM

## 2025-04-29 DIAGNOSIS — M51.369 BULGING LUMBAR DISC: ICD-10-CM

## 2025-04-29 DIAGNOSIS — H43.811 VITREOUS DEGENERATION OF RIGHT EYE: ICD-10-CM

## 2025-04-29 DIAGNOSIS — M54.50 LUMBAR BACK PAIN: ICD-10-CM

## 2025-04-29 PROCEDURE — 3075F SYST BP GE 130 - 139MM HG: CPT | Performed by: NURSE PRACTITIONER

## 2025-04-29 PROCEDURE — 3078F DIAST BP <80 MM HG: CPT | Performed by: NURSE PRACTITIONER

## 2025-04-29 PROCEDURE — 99214 OFFICE O/P EST MOD 30 MIN: CPT | Performed by: NURSE PRACTITIONER

## 2025-04-29 PROCEDURE — 3008F BODY MASS INDEX DOCD: CPT | Performed by: NURSE PRACTITIONER

## 2025-04-29 RX ORDER — GABAPENTIN 100 MG/1
100 CAPSULE ORAL AS DIRECTED
Qty: 90 CAPSULE | Refills: 0 | Status: SHIPPED | OUTPATIENT
Start: 2025-04-29
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
## 2025-04-29 NOTE — PATIENT INSTRUCTIONS
Debrox solution for ear wax, this is over the counter    Labs with [facility] - complete fasting, 8 hour fasting period water and black coffee are ok    Gabapentin 100mg   Begin by taking nightly   May increase to 100mg twice a day if does not cause drowsiness   May increase to 100mg three times a day after about 3 days of use if needed    Please plan on follow-up in 1 month via video or in office visit to discuss gabapentin and if it has been helpful/if we need to adjust the dose

## 2025-04-30 ENCOUNTER — MED REC SCAN ONLY (OUTPATIENT)
Dept: FAMILY MEDICINE CLINIC | Facility: CLINIC | Age: 55
End: 2025-04-30

## 2025-05-06 LAB
ABSOLUTE BASOPHILS: 41 CELLS/UL (ref 0–200)
ABSOLUTE EOSINOPHILS: 200 CELLS/UL (ref 15–500)
ABSOLUTE LYMPHOCYTES: 2387 CELLS/UL (ref 850–3900)
ABSOLUTE MONOCYTES: 352 CELLS/UL (ref 200–950)
ABSOLUTE NEUTROPHILS: 3919 CELLS/UL (ref 1500–7800)
ALBUMIN/GLOBULIN RATIO: 1.3 (CALC) (ref 1–2.5)
ALBUMIN: 4.1 G/DL (ref 3.6–5.1)
ALKALINE PHOSPHATASE: 87 U/L (ref 37–153)
ALT: 17 U/L (ref 6–29)
AST: 13 U/L (ref 10–35)
BASOPHILS: 0.6 %
BILIRUBIN, TOTAL: 0.5 MG/DL (ref 0.2–1.2)
BUN: 15 MG/DL (ref 7–25)
CALCIUM: 9.3 MG/DL (ref 8.6–10.4)
CARBON DIOXIDE: 30 MMOL/L (ref 20–32)
CHLORIDE: 103 MMOL/L (ref 98–110)
CHOL/HDLC RATIO: 2.4 (CALC)
CHOLESTEROL, TOTAL: 152 MG/DL
CREATININE: 0.73 MG/DL (ref 0.5–1.03)
EGFR: 98 ML/MIN/1.73M2
EOSINOPHILS: 2.9 %
GLOBULIN: 3.1 G/DL (CALC) (ref 1.9–3.7)
GLUCOSE: 96 MG/DL (ref 65–99)
HDL CHOLESTEROL: 64 MG/DL
HEMATOCRIT: 44.4 % (ref 35–45)
HEMOGLOBIN: 14 G/DL (ref 11.7–15.5)
LDL-CHOLESTEROL: 70 MG/DL (CALC)
LYMPHOCYTES: 34.6 %
MCH: 28.4 PG (ref 27–33)
MCHC: 31.5 G/DL (ref 32–36)
MCV: 90.1 FL (ref 80–100)
MONOCYTES: 5.1 %
MPV: 12.3 FL (ref 7.5–12.5)
NEUTROPHILS: 56.8 %
NON-HDL CHOLESTEROL: 88 MG/DL (CALC)
PLATELET COUNT: 182 THOUSAND/UL (ref 140–400)
POTASSIUM: 4.5 MMOL/L (ref 3.5–5.3)
PROTEIN, TOTAL: 7.2 G/DL (ref 6.1–8.1)
RDW: 13.1 % (ref 11–15)
RED BLOOD CELL COUNT: 4.93 MILLION/UL (ref 3.8–5.1)
SODIUM: 141 MMOL/L (ref 135–146)
TRIGLYCERIDES: 98 MG/DL
TSH W/REFLEX TO FT4: 1.38 MIU/L
VITAMIN B12: 833 PG/ML (ref 200–1100)
VITAMIN D, 25-OH, TOTAL: 54 NG/ML (ref 30–100)
WHITE BLOOD CELL COUNT: 6.9 THOUSAND/UL (ref 3.8–10.8)

## 2025-05-07 ENCOUNTER — MED REC SCAN ONLY (OUTPATIENT)
Dept: FAMILY MEDICINE CLINIC | Facility: CLINIC | Age: 55
End: 2025-05-07

## 2025-07-01 ENCOUNTER — APPOINTMENT (OUTPATIENT)
Dept: GASTROENTEROLOGY | Age: 55
End: 2025-07-01

## 2025-07-01 ENCOUNTER — TELEPHONE (OUTPATIENT)
Dept: GASTROENTEROLOGY | Age: 55
End: 2025-07-01

## 2025-07-01 VITALS — HEIGHT: 64 IN | WEIGHT: 293 LBS | BODY MASS INDEX: 50.02 KG/M2

## 2025-07-01 DIAGNOSIS — K62.5 RECTUM BLEEDING: Primary | ICD-10-CM

## 2025-07-01 PROCEDURE — 99204 OFFICE O/P NEW MOD 45 MIN: CPT

## 2025-07-01 RX ORDER — MULTIVITAMIN WITH IRON
50 TABLET ORAL DAILY
COMMUNITY

## 2025-07-01 RX ORDER — GABAPENTIN 100 MG/1
100 CAPSULE ORAL 4 TIMES DAILY
COMMUNITY

## 2025-07-01 RX ORDER — POLYETHYLENE GLYCOL 3350, SODIUM SULFATE, POTASSIUM CHLORIDE, MAGNESIUM SULFATE, AND SODIUM CHLORIDE FOR ORAL SOLUTION 178.7-7.3G
1 KIT ORAL SEE ADMIN INSTRUCTIONS
Qty: 1 EACH | Refills: 0 | Status: SHIPPED | OUTPATIENT
Start: 2025-07-01

## 2025-07-01 RX ORDER — FERROUS SULFATE 325(65) MG
65 TABLET ORAL
COMMUNITY

## 2025-07-15 DIAGNOSIS — M54.50 LUMBAR BACK PAIN: ICD-10-CM

## 2025-07-21 RX ORDER — GABAPENTIN 100 MG/1
100 CAPSULE ORAL AS DIRECTED
Qty: 30 CAPSULE | Refills: 0 | Status: SHIPPED | OUTPATIENT
Start: 2025-07-21

## 2025-07-21 NOTE — TELEPHONE ENCOUNTER
Per Last Office Visit:     3. Lumbar back pain  We discussed potential treatment options such as gabapentin, pain management referral for injections, and/or consult with ortho spine.    Alize would like to trial gabapentin.  Follow-up in 1-3 months, earlier appointment if needing dose increase  - gabapentin 100 MG Oral Cap; Take 1 capsule (100 mg total) by mouth As Directed. Begin by taking 100mg nightly before bed x 3 nights then increased to 100mg BID x 3 days and if needed and if tolerated increase to 100mg TID.  Dispense: 90 capsule; Refill: 0    Is refill appropriate   Medication pended for your review/approval

## 2025-07-24 ENCOUNTER — TELEPHONE (OUTPATIENT)
Dept: FAMILY MEDICINE CLINIC | Facility: CLINIC | Age: 55
End: 2025-07-24

## 2025-07-24 ENCOUNTER — OFFICE VISIT (OUTPATIENT)
Dept: FAMILY MEDICINE CLINIC | Facility: CLINIC | Age: 55
End: 2025-07-24
Payer: COMMERCIAL

## 2025-07-24 VITALS
HEIGHT: 64 IN | SYSTOLIC BLOOD PRESSURE: 120 MMHG | TEMPERATURE: 96 F | RESPIRATION RATE: 16 BRPM | BODY MASS INDEX: 50.02 KG/M2 | DIASTOLIC BLOOD PRESSURE: 70 MMHG | WEIGHT: 293 LBS | HEART RATE: 91 BPM | OXYGEN SATURATION: 97 %

## 2025-07-24 DIAGNOSIS — M54.50 LUMBAR BACK PAIN: ICD-10-CM

## 2025-07-24 DIAGNOSIS — Z12.31 VISIT FOR SCREENING MAMMOGRAM: ICD-10-CM

## 2025-07-24 DIAGNOSIS — R21 RASH: Primary | ICD-10-CM

## 2025-07-24 PROCEDURE — 3074F SYST BP LT 130 MM HG: CPT | Performed by: FAMILY MEDICINE

## 2025-07-24 PROCEDURE — 99213 OFFICE O/P EST LOW 20 MIN: CPT | Performed by: FAMILY MEDICINE

## 2025-07-24 PROCEDURE — 3078F DIAST BP <80 MM HG: CPT | Performed by: FAMILY MEDICINE

## 2025-07-24 PROCEDURE — G2211 COMPLEX E/M VISIT ADD ON: HCPCS | Performed by: FAMILY MEDICINE

## 2025-07-24 PROCEDURE — 3008F BODY MASS INDEX DOCD: CPT | Performed by: FAMILY MEDICINE

## 2025-07-24 RX ORDER — GABAPENTIN 100 MG/1
100 CAPSULE ORAL 3 TIMES DAILY
Qty: 90 CAPSULE | Refills: 0 | Status: SHIPPED | OUTPATIENT
Start: 2025-07-24 | End: 2025-08-23

## 2025-07-24 RX ORDER — KETOCONAZOLE 20 MG/G
CREAM TOPICAL
Qty: 60 G | Refills: 0 | Status: SHIPPED | OUTPATIENT
Start: 2025-07-24

## 2025-07-24 RX ORDER — PREDNISONE 20 MG/1
50 TABLET ORAL DAILY
Qty: 13 TABLET | Refills: 0 | Status: SHIPPED | OUTPATIENT
Start: 2025-07-24 | End: 2025-07-29
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
## 2025-07-24 NOTE — TELEPHONE ENCOUNTER
Fax from pharmacy stating they need more information on Ketoconazole script    Called and spoke with pharmacist who states they need the length of time patient will use.    Discussed with Dr Gloria as covering provider who states up to 14 days.    Pharmacist notified and verbalized understanding.

## 2025-08-18 ENCOUNTER — APPOINTMENT (OUTPATIENT)
Dept: GASTROENTEROLOGY | Age: 55
End: 2025-08-18
Attending: INTERNAL MEDICINE

## 2025-08-25 ENCOUNTER — OFFICE VISIT (OUTPATIENT)
Dept: FAMILY MEDICINE CLINIC | Facility: CLINIC | Age: 55
End: 2025-08-25

## 2025-08-25 VITALS
TEMPERATURE: 98 F | SYSTOLIC BLOOD PRESSURE: 136 MMHG | OXYGEN SATURATION: 99 % | DIASTOLIC BLOOD PRESSURE: 80 MMHG | WEIGHT: 293 LBS | HEART RATE: 90 BPM | BODY MASS INDEX: 55 KG/M2

## 2025-08-25 DIAGNOSIS — H60.503 ACUTE OTITIS EXTERNA OF BOTH EARS, UNSPECIFIED TYPE: Primary | ICD-10-CM

## 2025-08-25 DIAGNOSIS — L91.8 SKIN TAGS, MULTIPLE ACQUIRED: ICD-10-CM

## 2025-08-25 PROCEDURE — 3075F SYST BP GE 130 - 139MM HG: CPT

## 2025-08-25 PROCEDURE — 99214 OFFICE O/P EST MOD 30 MIN: CPT

## 2025-08-25 PROCEDURE — 3079F DIAST BP 80-89 MM HG: CPT

## 2025-08-25 RX ORDER — CIPROFLOXACIN 500 MG/1
750 TABLET, FILM COATED ORAL 2 TIMES DAILY
Qty: 30 TABLET | Refills: 0 | Status: SHIPPED | OUTPATIENT
Start: 2025-08-25 | End: 2025-09-04

## 2025-08-25 RX ORDER — CIPROFLOXACIN AND DEXAMETHASONE 3; 1 MG/ML; MG/ML
4 SUSPENSION/ DROPS AURICULAR (OTIC) 2 TIMES DAILY
Qty: 7.5 ML | Refills: 0 | Status: SHIPPED | OUTPATIENT
Start: 2025-08-25 | End: 2025-09-01

## 2025-08-26 ENCOUNTER — TELEPHONE (OUTPATIENT)
Dept: FAMILY MEDICINE CLINIC | Facility: CLINIC | Age: 55
End: 2025-08-26

## 2025-08-27 ENCOUNTER — TELEPHONE (OUTPATIENT)
Dept: FAMILY MEDICINE CLINIC | Facility: CLINIC | Age: 55
End: 2025-08-27

## 2025-08-27 DIAGNOSIS — H92.09 OTALGIA, UNSPECIFIED LATERALITY: Primary | ICD-10-CM

## (undated) DIAGNOSIS — R09.81 NASAL CONGESTION: ICD-10-CM

## (undated) NOTE — LETTER
Date: 3/5/2020    Patient Name: Naomy Booth          To Whom it may concern: This letter has been written at the patient's request. The above patient was seen at the [facility] for treatment of a medical condition.     This p

## (undated) NOTE — LETTER
Patient Name: Mau Katz  YOB: 1970          MRN number:  [id number]  Date:  11/27/2017  Referring Physician:  Shalom Reynolds    Discharge Summary  Number of Visits Attended 7 in Physical Therapy    Dear Dr. Sg Lamar has at Goals:   · Pt will demonstrate improved DF AROM to >= 10 degrees to promote proper foot clearance during gait and greater ease descending stairs without compensation (4 visits) MET 11/27/2017  · Pt will have increased ankle strength to 5/5 throughout for i

## (undated) NOTE — LETTER
Date: 2/20/2023    Patient Name: Clair Kraus          To Whom it may concern: This letter has been written at the patient's request. The above patient was seen at the [facility] for treatment of a medical condition. This patient should be excused from attending work 2/20/23    The patient may return to work on 2/21/23 with no limitations.         Sincerely,    TORI Vasquez

## (undated) NOTE — LETTER
Date: 11/13/2023    Patient Name: Kim Cason          To Whom it may concern: This letter has been written at the patient's request. The above patient was seen at the [facility] for treatment of a medical condition. The patient may return to work/school on 11/15/23.        Sincerely,    Vicki Arrington MD

## (undated) NOTE — LETTER
Alize Hogan   [address] [facility] [address]           Dear Alize Hogan     Our records indicate that you have outstanding lab work and or testing that was ordered for you and has not yet been completed:  Lab Frequency Next Occurrence   CT CALCIUM SCORING Once 01/10/2024      To provide you with the best possible care, please complete these orders at your earliest convenience. If you have recently completed these orders please disregard this letter.     If you have any questions please call the office at [phone].     Thank you,     [facility]

## (undated) NOTE — LETTER
[facility] [address]    3/18/2019      Dear  Chuy Mcknight    In order to provide the highest quality care, YASSINE Belcher uses a sophisticated computer system to track

## (undated) NOTE — LETTER
08/05/21        Fely TannerMarlene  [address] [address]      Dear Katherine Bring records indicate that you have outstanding lab work and or testing that was ordered for you and has not yet been completed:  Orders Placed This Encounte

## (undated) NOTE — LETTER
Date: 11/13/2023    Patient Name: Airam Ramirez          To Whom it may concern: This letter has been written at the patient's request. The above patient was seen at the [facility] for treatment of a medical condition. This patient should be excused from attending work/school from 11/9/23 through 11/14/23. The patient may return to work/school on 11/15/23.        Sincerely,    Marlen Valle DO